# Patient Record
Sex: FEMALE | Race: WHITE | Employment: OTHER | URBAN - NONMETROPOLITAN AREA
[De-identification: names, ages, dates, MRNs, and addresses within clinical notes are randomized per-mention and may not be internally consistent; named-entity substitution may affect disease eponyms.]

---

## 2017-01-10 ENCOUNTER — OFFICE VISIT (OUTPATIENT)
Dept: FAMILY MEDICINE CLINIC | Age: 77
End: 2017-01-10

## 2017-01-10 VITALS
DIASTOLIC BLOOD PRESSURE: 66 MMHG | WEIGHT: 138.6 LBS | HEIGHT: 60 IN | HEART RATE: 84 BPM | SYSTOLIC BLOOD PRESSURE: 136 MMHG | BODY MASS INDEX: 27.21 KG/M2

## 2017-01-10 DIAGNOSIS — I25.10 ASCVD (ARTERIOSCLEROTIC CARDIOVASCULAR DISEASE): ICD-10-CM

## 2017-01-10 DIAGNOSIS — I10 ESSENTIAL HYPERTENSION: ICD-10-CM

## 2017-01-10 DIAGNOSIS — E78.00 PURE HYPERCHOLESTEROLEMIA: Primary | ICD-10-CM

## 2017-01-10 PROCEDURE — 99213 OFFICE O/P EST LOW 20 MIN: CPT | Performed by: FAMILY MEDICINE

## 2017-01-10 RX ORDER — SIMVASTATIN 20 MG
20 TABLET ORAL NIGHTLY
Qty: 90 TABLET | Refills: 1 | Status: SHIPPED | OUTPATIENT
Start: 2017-01-10 | End: 2017-07-11 | Stop reason: SDUPTHER

## 2017-01-10 RX ORDER — FLUOXETINE HYDROCHLORIDE 20 MG/1
20 CAPSULE ORAL DAILY
Qty: 90 CAPSULE | Refills: 1 | Status: SHIPPED | OUTPATIENT
Start: 2017-01-10 | End: 2017-07-11 | Stop reason: SDUPTHER

## 2017-01-10 RX ORDER — AMLODIPINE BESYLATE 5 MG/1
5 TABLET ORAL DAILY
Qty: 90 TABLET | Refills: 1 | Status: SHIPPED | OUTPATIENT
Start: 2017-01-10 | End: 2017-07-11 | Stop reason: SDUPTHER

## 2017-01-10 ASSESSMENT — PATIENT HEALTH QUESTIONNAIRE - PHQ9
1. LITTLE INTEREST OR PLEASURE IN DOING THINGS: 0
SUM OF ALL RESPONSES TO PHQ9 QUESTIONS 1 & 2: 0
2. FEELING DOWN, DEPRESSED OR HOPELESS: 0
SUM OF ALL RESPONSES TO PHQ QUESTIONS 1-9: 0

## 2017-07-11 ENCOUNTER — OFFICE VISIT (OUTPATIENT)
Dept: FAMILY MEDICINE CLINIC | Age: 77
End: 2017-07-11

## 2017-07-11 VITALS
SYSTOLIC BLOOD PRESSURE: 146 MMHG | HEART RATE: 68 BPM | BODY MASS INDEX: 26.97 KG/M2 | WEIGHT: 137.4 LBS | DIASTOLIC BLOOD PRESSURE: 80 MMHG | HEIGHT: 60 IN

## 2017-07-11 DIAGNOSIS — I10 ESSENTIAL HYPERTENSION: Primary | ICD-10-CM

## 2017-07-11 DIAGNOSIS — E78.00 PURE HYPERCHOLESTEROLEMIA: ICD-10-CM

## 2017-07-11 DIAGNOSIS — F33.42 RECURRENT MAJOR DEPRESSIVE DISORDER, IN FULL REMISSION (HCC): ICD-10-CM

## 2017-07-11 PROBLEM — F32.5 MAJOR DEPRESSIVE DISORDER WITH SINGLE EPISODE, IN FULL REMISSION (HCC): Status: ACTIVE | Noted: 2017-07-11

## 2017-07-11 PROBLEM — F32.5 MAJOR DEPRESSIVE DISORDER WITH SINGLE EPISODE, IN FULL REMISSION (HCC): Status: RESOLVED | Noted: 2017-07-11 | Resolved: 2017-07-11

## 2017-07-11 PROCEDURE — 99214 OFFICE O/P EST MOD 30 MIN: CPT | Performed by: FAMILY MEDICINE

## 2017-07-11 RX ORDER — AMLODIPINE BESYLATE 10 MG/1
10 TABLET ORAL DAILY
Qty: 90 TABLET | Refills: 1 | Status: SHIPPED | OUTPATIENT
Start: 2017-07-11 | End: 2018-01-15 | Stop reason: SDUPTHER

## 2017-07-11 RX ORDER — FLUOXETINE HYDROCHLORIDE 20 MG/1
20 CAPSULE ORAL DAILY
Qty: 90 CAPSULE | Refills: 1 | Status: SHIPPED | OUTPATIENT
Start: 2017-07-11 | End: 2018-01-04 | Stop reason: SDUPTHER

## 2017-07-11 RX ORDER — SIMVASTATIN 20 MG
20 TABLET ORAL NIGHTLY
Qty: 90 TABLET | Refills: 1 | Status: SHIPPED | OUTPATIENT
Start: 2017-07-11 | End: 2018-01-15 | Stop reason: SDUPTHER

## 2017-07-11 ASSESSMENT — ENCOUNTER SYMPTOMS
NAUSEA: 0
ABDOMINAL PAIN: 0
COUGH: 0
SHORTNESS OF BREATH: 0

## 2017-07-24 ENCOUNTER — NURSE ONLY (OUTPATIENT)
Dept: FAMILY MEDICINE CLINIC | Age: 77
End: 2017-07-24

## 2017-07-24 VITALS — SYSTOLIC BLOOD PRESSURE: 158 MMHG | DIASTOLIC BLOOD PRESSURE: 74 MMHG | HEART RATE: 80 BPM

## 2017-07-24 DIAGNOSIS — I10 ESSENTIAL HYPERTENSION: ICD-10-CM

## 2017-07-24 DIAGNOSIS — Z01.30 BLOOD PRESSURE CHECK: Primary | ICD-10-CM

## 2017-07-24 RX ORDER — LISINOPRIL 10 MG/1
10 TABLET ORAL DAILY
Qty: 90 TABLET | Refills: 0 | Status: SHIPPED | OUTPATIENT
Start: 2017-07-24 | End: 2017-07-24 | Stop reason: SDUPTHER

## 2017-07-25 ENCOUNTER — TELEPHONE (OUTPATIENT)
Dept: FAMILY MEDICINE CLINIC | Age: 77
End: 2017-07-25

## 2017-07-25 LAB
ABSOLUTE BASO #: 0.1 K/UL (ref 0–0.1)
ABSOLUTE EOS #: 0.3 K/UL (ref 0.1–0.4)
ABSOLUTE LYMPH #: 3 K/UL (ref 0.8–5.2)
ABSOLUTE MONO #: 0.7 K/UL (ref 0.1–0.9)
ABSOLUTE NEUT #: 2.8 K/UL (ref 1.3–9.1)
ALBUMIN SERPL-MCNC: 4.1 G/DL (ref 3.2–5.3)
ALK PHOSPHATASE: 81 IU/L (ref 35–121)
ALT SERPL-CCNC: 12 IU/L (ref 5–59)
ANION GAP SERPL CALCULATED.3IONS-SCNC: 11 MMOL/L
AST SERPL-CCNC: 21 IU/L (ref 10–42)
BASOPHILS RELATIVE PERCENT: 1.3 %
BILIRUB SERPL-MCNC: 0.5 MG/DL (ref 0.2–1.3)
BUN BLDV-MCNC: 14 MG/DL (ref 9–24)
CALCIUM SERPL-MCNC: 9.8 MG/DL (ref 8.7–10.8)
CHLORIDE BLD-SCNC: 104 MMOL/L (ref 95–111)
CHOLESTEROL/HDL RATIO: 3.7
CHOLESTEROL: 174 MG/DL
CO2: 29 MMOL/L (ref 21–32)
CREAT SERPL-MCNC: 0.9 MG/DL (ref 0.5–1.3)
EGFR AFRICAN AMERICAN: 74
EGFR IF NONAFRICAN AMERICAN: 61
EOSINOPHILS RELATIVE PERCENT: 4.2 %
GLUCOSE: 92 MG/DL (ref 70–100)
HCT VFR BLD CALC: 39.8 % (ref 36–48)
HDLC SERPL-MCNC: 47 MG/DL (ref 40–60)
HEMOGLOBIN: 13 G/DL (ref 12–16)
LDL CHOLESTEROL CALCULATED: 103 MG/DL
LDL/HDL RATIO: 2.2
LYMPHOCYTE %: 42.8 %
MCH RBC QN AUTO: 29 PG (ref 27–34)
MCHC RBC AUTO-ENTMCNC: 32.7 G/DL (ref 31–36)
MCV RBC AUTO: 88.6 FL (ref 80–100)
MONOCYTES # BLD: 10.4 %
NEUTROPHILS RELATIVE PERCENT: 41 %
PDW BLD-RTO: 13.4 % (ref 10.8–14.8)
PLATELETS: 269 K/UL (ref 150–450)
POTASSIUM SERPL-SCNC: 4.4 MMOL/L (ref 3.5–5.4)
RBC: 4.49 M/UL (ref 4–5.5)
SODIUM BLD-SCNC: 140 MMOL/L (ref 134–147)
TOTAL PROTEIN: 7.3 G/DL (ref 5.8–8)
TRIGL SERPL-MCNC: 122 MG/DL
VLDLC SERPL CALC-MCNC: 24 MG/DL
WBC: 6.9 K/UL (ref 3.7–10.8)

## 2017-07-25 RX ORDER — LISINOPRIL 10 MG/1
TABLET ORAL
Qty: 90 TABLET | Refills: 1 | Status: SHIPPED | OUTPATIENT
Start: 2017-07-25 | End: 2018-03-28 | Stop reason: SDUPTHER

## 2017-08-03 ENCOUNTER — NURSE ONLY (OUTPATIENT)
Dept: FAMILY MEDICINE CLINIC | Age: 77
End: 2017-08-03

## 2017-08-03 VITALS — DIASTOLIC BLOOD PRESSURE: 70 MMHG | SYSTOLIC BLOOD PRESSURE: 134 MMHG | HEART RATE: 64 BPM

## 2017-08-03 DIAGNOSIS — Z01.30 BLOOD PRESSURE CHECK: Primary | ICD-10-CM

## 2017-08-04 ENCOUNTER — TELEPHONE (OUTPATIENT)
Dept: FAMILY MEDICINE CLINIC | Age: 77
End: 2017-08-04

## 2017-08-04 LAB
ANION GAP SERPL CALCULATED.3IONS-SCNC: 13 MMOL/L
BUN BLDV-MCNC: 17 MG/DL (ref 9–24)
CALCIUM SERPL-MCNC: 9.8 MG/DL (ref 8.7–10.8)
CHLORIDE BLD-SCNC: 103 MMOL/L (ref 95–111)
CO2: 29 MMOL/L (ref 21–32)
CREAT SERPL-MCNC: 0.9 MG/DL (ref 0.5–1.3)
EGFR AFRICAN AMERICAN: 74
EGFR IF NONAFRICAN AMERICAN: 61
GLUCOSE: 98 MG/DL (ref 70–100)
POTASSIUM SERPL-SCNC: 4.4 MMOL/L (ref 3.5–5.4)
SODIUM BLD-SCNC: 141 MMOL/L (ref 134–147)

## 2018-01-02 DIAGNOSIS — I10 ESSENTIAL HYPERTENSION: ICD-10-CM

## 2018-01-02 RX ORDER — LISINOPRIL 10 MG/1
TABLET ORAL
Qty: 90 TABLET | Refills: 0 | Status: SHIPPED | OUTPATIENT
Start: 2018-01-02 | End: 2018-01-15 | Stop reason: CLARIF

## 2018-01-04 DIAGNOSIS — F33.42 RECURRENT MAJOR DEPRESSIVE DISORDER, IN FULL REMISSION (HCC): ICD-10-CM

## 2018-01-05 RX ORDER — FLUOXETINE HYDROCHLORIDE 20 MG/1
CAPSULE ORAL
Qty: 90 CAPSULE | Refills: 0 | Status: SHIPPED | OUTPATIENT
Start: 2018-01-05 | End: 2018-03-28 | Stop reason: SDUPTHER

## 2018-01-15 ENCOUNTER — OFFICE VISIT (OUTPATIENT)
Dept: FAMILY MEDICINE CLINIC | Age: 78
End: 2018-01-15
Payer: MEDICARE

## 2018-01-15 VITALS
HEART RATE: 56 BPM | DIASTOLIC BLOOD PRESSURE: 80 MMHG | HEIGHT: 60 IN | BODY MASS INDEX: 25.72 KG/M2 | WEIGHT: 131 LBS | SYSTOLIC BLOOD PRESSURE: 118 MMHG

## 2018-01-15 DIAGNOSIS — R05.8 ACE-INHIBITOR COUGH: ICD-10-CM

## 2018-01-15 DIAGNOSIS — E78.00 PURE HYPERCHOLESTEROLEMIA: ICD-10-CM

## 2018-01-15 DIAGNOSIS — F33.42 RECURRENT MAJOR DEPRESSIVE DISORDER, IN FULL REMISSION (HCC): ICD-10-CM

## 2018-01-15 DIAGNOSIS — I10 ESSENTIAL HYPERTENSION: Primary | ICD-10-CM

## 2018-01-15 DIAGNOSIS — T46.4X5A ACE-INHIBITOR COUGH: ICD-10-CM

## 2018-01-15 PROCEDURE — 99214 OFFICE O/P EST MOD 30 MIN: CPT | Performed by: FAMILY MEDICINE

## 2018-01-15 PROCEDURE — G8510 SCR DEP NEG, NO PLAN REQD: HCPCS | Performed by: FAMILY MEDICINE

## 2018-01-15 PROCEDURE — 3288F FALL RISK ASSESSMENT DOCD: CPT | Performed by: FAMILY MEDICINE

## 2018-01-15 RX ORDER — AMLODIPINE BESYLATE 10 MG/1
10 TABLET ORAL DAILY
Qty: 90 TABLET | Refills: 1 | Status: SHIPPED | OUTPATIENT
Start: 2018-01-15 | End: 2018-07-01 | Stop reason: SDUPTHER

## 2018-01-15 RX ORDER — SIMVASTATIN 20 MG
20 TABLET ORAL NIGHTLY
Qty: 90 TABLET | Refills: 1 | Status: SHIPPED | OUTPATIENT
Start: 2018-01-15 | End: 2018-07-01 | Stop reason: SDUPTHER

## 2018-01-15 RX ORDER — LOSARTAN POTASSIUM 25 MG/1
25 TABLET ORAL DAILY
Qty: 90 TABLET | Refills: 1 | Status: SHIPPED | OUTPATIENT
Start: 2018-01-15 | End: 2018-07-01 | Stop reason: SDUPTHER

## 2018-01-15 ASSESSMENT — PATIENT HEALTH QUESTIONNAIRE - PHQ9
1. LITTLE INTEREST OR PLEASURE IN DOING THINGS: 0
2. FEELING DOWN, DEPRESSED OR HOPELESS: 0
SUM OF ALL RESPONSES TO PHQ9 QUESTIONS 1 & 2: 0
SUM OF ALL RESPONSES TO PHQ QUESTIONS 1-9: 0

## 2018-01-15 ASSESSMENT — ENCOUNTER SYMPTOMS
NAUSEA: 0
ABDOMINAL PAIN: 0
WHEEZING: 0
SHORTNESS OF BREATH: 0

## 2018-01-15 NOTE — PROGRESS NOTES
nightly 90 tablet 1    aspirin 81 MG tablet Take 1 tablet by mouth daily (with breakfast) 90 tablet 1     No current facility-administered medications for this visit. No Known Allergies  Health Maintenance   Topic Date Due    DTaP/Tdap/Td vaccine (1 - Tdap) 09/02/1959    Pneumococcal low/med risk (1 of 2 - PCV13) 09/02/2005    Flu vaccine (1) 09/01/2017    Potassium monitoring  08/03/2018    Creatinine monitoring  08/03/2018    Lipid screen  07/24/2022    Zostavax vaccine  Addressed    DEXA (modify frequency per FRAX score)  Addressed       Objective:     /80 (Site: Left Arm, Position: Sitting, Cuff Size: Medium Adult)   Pulse 56   Ht 5' (1.524 m)   Wt 131 lb (59.4 kg)   BMI 25.58 kg/m²   Physical Exam   Constitutional: She is oriented to person, place, and time. She appears well-developed and well-nourished. Cardiovascular: Normal rate and regular rhythm. Murmur heard. Systolic murmur is present with a grade of 2/6   Pulmonary/Chest: Effort normal and breath sounds normal. No respiratory distress. She has no wheezes. Musculoskeletal: She exhibits edema (trace BLE). Neurological: She is alert and oriented to person, place, and time. Psychiatric: She has a normal mood and affect. Her behavior is normal.   Vitals reviewed. Lab Results   Component Value Date    WBC 6.9 07/24/2017    HGB 13.0 07/24/2017    HCT 39.8 07/24/2017     07/24/2017    CHOL 174 07/24/2017    TRIG 122 07/24/2017    HDL 47 07/24/2017    ALT 12 07/24/2017    AST 21 07/24/2017     08/03/2017    K 4.4 08/03/2017     08/03/2017    CREATININE 0.9 08/03/2017    BUN 17 08/03/2017    CO2 29 08/03/2017    TSH 2.380 05/16/2015       Impression/Plan:  1. Essential hypertension  Controlled  Stop lisinopril.  -refill amLODIPine (NORVASC) 10 MG tablet; Take 1 tablet by mouth daily  Dispense: 90 tablet; Refill: 1  -start losartan (COZAAR) 25 MG tablet;  Take 1 tablet by mouth daily  Dispense: 90 tablet;

## 2018-01-29 ENCOUNTER — NURSE ONLY (OUTPATIENT)
Dept: FAMILY MEDICINE CLINIC | Age: 78
End: 2018-01-29
Payer: MEDICARE

## 2018-01-29 DIAGNOSIS — Z01.30 BLOOD PRESSURE CHECK: Primary | ICD-10-CM

## 2018-01-29 DIAGNOSIS — I10 ESSENTIAL HYPERTENSION: ICD-10-CM

## 2018-01-29 LAB
ANION GAP SERPL CALCULATED.3IONS-SCNC: 14 MEQ/L (ref 8–16)
BUN BLDV-MCNC: 20 MG/DL (ref 7–22)
CALCIUM SERPL-MCNC: 10.1 MG/DL (ref 8.5–10.5)
CHLORIDE BLD-SCNC: 100 MEQ/L (ref 98–111)
CO2: 28 MEQ/L (ref 23–33)
CREAT SERPL-MCNC: 0.8 MG/DL (ref 0.4–1.2)
GFR SERPL CREATININE-BSD FRML MDRD: 69 ML/MIN/1.73M2
GLUCOSE BLD-MCNC: 113 MG/DL (ref 70–108)
POTASSIUM SERPL-SCNC: 4.6 MEQ/L (ref 3.5–5.2)
SODIUM BLD-SCNC: 142 MEQ/L (ref 135–145)

## 2018-01-29 PROCEDURE — 36415 COLL VENOUS BLD VENIPUNCTURE: CPT | Performed by: FAMILY MEDICINE

## 2018-01-30 ENCOUNTER — TELEPHONE (OUTPATIENT)
Dept: FAMILY MEDICINE CLINIC | Age: 78
End: 2018-01-30

## 2018-01-30 NOTE — TELEPHONE ENCOUNTER
----- Message from Carmen Wiseman MD sent at 1/30/2018  7:40 AM EST -----  BMP is good after changing to losartan.  continue losartan. Please advise patient.   Carmen Wiseman MD

## 2018-03-28 DIAGNOSIS — I10 ESSENTIAL HYPERTENSION: ICD-10-CM

## 2018-03-28 DIAGNOSIS — F33.42 RECURRENT MAJOR DEPRESSIVE DISORDER, IN FULL REMISSION (HCC): ICD-10-CM

## 2018-03-28 RX ORDER — LISINOPRIL 10 MG/1
TABLET ORAL
Qty: 90 TABLET | Refills: 1 | Status: SHIPPED | OUTPATIENT
Start: 2018-03-28 | End: 2018-07-16

## 2018-03-28 RX ORDER — FLUOXETINE HYDROCHLORIDE 20 MG/1
CAPSULE ORAL
Qty: 90 CAPSULE | Refills: 0 | Status: SHIPPED | OUTPATIENT
Start: 2018-03-28 | End: 2018-07-01 | Stop reason: SDUPTHER

## 2018-04-11 ENCOUNTER — HOSPITAL ENCOUNTER (OUTPATIENT)
Dept: MAMMOGRAPHY | Age: 78
Discharge: HOME | End: 2018-04-11
Payer: MEDICARE

## 2018-04-11 DIAGNOSIS — Z12.39 SCREENING FOR BREAST CANCER: ICD-10-CM

## 2018-04-11 PROCEDURE — 77067 SCR MAMMO BI INCL CAD: CPT

## 2018-07-01 DIAGNOSIS — E78.00 PURE HYPERCHOLESTEROLEMIA: ICD-10-CM

## 2018-07-01 DIAGNOSIS — I10 ESSENTIAL HYPERTENSION: ICD-10-CM

## 2018-07-01 DIAGNOSIS — F33.42 RECURRENT MAJOR DEPRESSIVE DISORDER, IN FULL REMISSION (HCC): ICD-10-CM

## 2018-07-02 RX ORDER — AMLODIPINE BESYLATE 10 MG/1
TABLET ORAL
Qty: 90 TABLET | Refills: 0 | Status: SHIPPED | OUTPATIENT
Start: 2018-07-02 | End: 2018-07-16 | Stop reason: SDUPTHER

## 2018-07-02 RX ORDER — LOSARTAN POTASSIUM 25 MG/1
TABLET ORAL
Qty: 90 TABLET | Refills: 0 | Status: SHIPPED | OUTPATIENT
Start: 2018-07-02 | End: 2018-07-16 | Stop reason: SDUPTHER

## 2018-07-02 RX ORDER — FLUOXETINE HYDROCHLORIDE 20 MG/1
CAPSULE ORAL
Qty: 90 CAPSULE | Refills: 0 | Status: SHIPPED | OUTPATIENT
Start: 2018-07-02 | End: 2018-07-16 | Stop reason: SDUPTHER

## 2018-07-02 RX ORDER — SIMVASTATIN 20 MG
TABLET ORAL
Qty: 90 TABLET | Refills: 0 | Status: SHIPPED | OUTPATIENT
Start: 2018-07-02 | End: 2018-07-16 | Stop reason: SDUPTHER

## 2018-07-02 NOTE — TELEPHONE ENCOUNTER
Maribell Huerta called requesting a refill on the following medications:  Requested Prescriptions     Pending Prescriptions Disp Refills    simvastatin (ZOCOR) 20 MG tablet [Pharmacy Med Name: Simvastatin Oral Tablet 20 MG] 90 tablet 0     Sig: TAKE 1 TABLET BY MOUTH ONE TIME A DAY AT NIGHT    amLODIPine (NORVASC) 10 MG tablet [Pharmacy Med Name:  AmLODIPine Besylate Oral Tablet 10 MG] 90 tablet 0     Sig: TAKE 1 TABLET BY MOUTH ONE TIME A DAY HOLD IF SYSTOLIC BLOOD PRESSURE IS LESS THAN 130    losartan (COZAAR) 25 MG tablet [Pharmacy Med Name: Losartan Potassium Oral Tablet 25 MG] 90 tablet 0     Sig: TAKE 1 TABLET BY MOUTH ONE TIME A DAY    FLUoxetine (PROZAC) 20 MG capsule [Pharmacy Med Name: FLUoxetine HCl Oral Capsule 20 MG] 90 capsule 0     Sig: TAKE 1 CAPSULE BY MOUTH ONE TIME A DAY       Date of last visit: 1/15/2018  Date of next visit (if applicable):7/16/2018  Date of last refill: 01/15/2018  Pharmacy Name: [id number].      Thanks,  Vonda [place], [place]

## 2018-07-03 ENCOUNTER — TELEPHONE (OUTPATIENT)
Dept: FAMILY MEDICINE CLINIC | Age: 78
End: 2018-07-03

## 2018-07-03 DIAGNOSIS — E78.00 PURE HYPERCHOLESTEROLEMIA: ICD-10-CM

## 2018-07-03 DIAGNOSIS — R73.09 ELEVATED GLUCOSE LEVEL: ICD-10-CM

## 2018-07-03 DIAGNOSIS — I10 ESSENTIAL HYPERTENSION: Primary | ICD-10-CM

## 2018-07-03 NOTE — TELEPHONE ENCOUNTER
Safia called. She has an appointment 7/16/18. Does she need to have blood work done? She will call back to check on Thursday.

## 2018-07-06 ENCOUNTER — NURSE ONLY (OUTPATIENT)
Dept: FAMILY MEDICINE CLINIC | Age: 78
End: 2018-07-06
Payer: MEDICARE

## 2018-07-06 DIAGNOSIS — I10 ESSENTIAL HYPERTENSION: ICD-10-CM

## 2018-07-06 DIAGNOSIS — R73.09 ELEVATED GLUCOSE LEVEL: ICD-10-CM

## 2018-07-06 DIAGNOSIS — E78.00 PURE HYPERCHOLESTEROLEMIA: ICD-10-CM

## 2018-07-06 LAB
ALBUMIN SERPL-MCNC: 4.2 G/DL (ref 3.5–5.1)
ALP BLD-CCNC: 96 U/L (ref 38–126)
ALT SERPL-CCNC: 13 U/L (ref 11–66)
ANION GAP SERPL CALCULATED.3IONS-SCNC: 15 MEQ/L (ref 8–16)
AST SERPL-CCNC: 24 U/L (ref 5–40)
AVERAGE GLUCOSE: 102 MG/DL (ref 70–126)
BILIRUB SERPL-MCNC: 0.4 MG/DL (ref 0.3–1.2)
BUN BLDV-MCNC: 18 MG/DL (ref 7–22)
CALCIUM SERPL-MCNC: 9.7 MG/DL (ref 8.5–10.5)
CHLORIDE BLD-SCNC: 106 MEQ/L (ref 98–111)
CHOLESTEROL, TOTAL: 185 MG/DL (ref 100–199)
CO2: 25 MEQ/L (ref 23–33)
CREAT SERPL-MCNC: 0.8 MG/DL (ref 0.4–1.2)
ERYTHROCYTE [DISTWIDTH] IN BLOOD BY AUTOMATED COUNT: 13.5 % (ref 11.5–14.5)
ERYTHROCYTE [DISTWIDTH] IN BLOOD BY AUTOMATED COUNT: 45 FL (ref 35–45)
GFR SERPL CREATININE-BSD FRML MDRD: 69 ML/MIN/1.73M2
GLUCOSE BLD-MCNC: 97 MG/DL (ref 70–108)
HBA1C MFR BLD: 5.4 % (ref 4.4–6.4)
HCT VFR BLD CALC: 38.7 % (ref 37–47)
HDLC SERPL-MCNC: 45 MG/DL
HEMOGLOBIN: 12.6 GM/DL (ref 12–16)
LDL CHOLESTEROL CALCULATED: 111 MG/DL
MCH RBC QN AUTO: 29.5 PG (ref 26–33)
MCHC RBC AUTO-ENTMCNC: 32.6 GM/DL (ref 32.2–35.5)
MCV RBC AUTO: 90.6 FL (ref 81–99)
PLATELET # BLD: 299 THOU/MM3 (ref 130–400)
PMV BLD AUTO: 11.8 FL (ref 9.4–12.4)
POTASSIUM SERPL-SCNC: 4.4 MEQ/L (ref 3.5–5.2)
RBC # BLD: 4.27 MILL/MM3 (ref 4.2–5.4)
SODIUM BLD-SCNC: 146 MEQ/L (ref 135–145)
TOTAL PROTEIN: 8 G/DL (ref 6.1–8)
TRIGL SERPL-MCNC: 144 MG/DL (ref 0–199)
WBC # BLD: 8.2 THOU/MM3 (ref 4.8–10.8)

## 2018-07-06 PROCEDURE — 36415 COLL VENOUS BLD VENIPUNCTURE: CPT | Performed by: FAMILY MEDICINE

## 2018-07-09 ENCOUNTER — TELEPHONE (OUTPATIENT)
Dept: FAMILY MEDICINE CLINIC | Age: 78
End: 2018-07-09

## 2018-07-09 NOTE — TELEPHONE ENCOUNTER
----- Message from Holly Mancia MD sent at 7/6/2018  9:07 PM EDT -----  a1c is in normal range. CMP and CBC are good. Lipids are good. Please advise patient.   Holly Mancia MD

## 2018-07-16 ENCOUNTER — OFFICE VISIT (OUTPATIENT)
Dept: FAMILY MEDICINE CLINIC | Age: 78
End: 2018-07-16
Payer: MEDICARE

## 2018-07-16 VITALS
HEIGHT: 60 IN | HEART RATE: 78 BPM | BODY MASS INDEX: 25.52 KG/M2 | SYSTOLIC BLOOD PRESSURE: 142 MMHG | DIASTOLIC BLOOD PRESSURE: 80 MMHG | WEIGHT: 130 LBS

## 2018-07-16 DIAGNOSIS — F33.42 RECURRENT MAJOR DEPRESSIVE DISORDER, IN FULL REMISSION (HCC): ICD-10-CM

## 2018-07-16 DIAGNOSIS — I10 ESSENTIAL HYPERTENSION: Primary | ICD-10-CM

## 2018-07-16 DIAGNOSIS — E78.00 PURE HYPERCHOLESTEROLEMIA: ICD-10-CM

## 2018-07-16 PROCEDURE — 99214 OFFICE O/P EST MOD 30 MIN: CPT | Performed by: FAMILY MEDICINE

## 2018-07-16 RX ORDER — LOSARTAN POTASSIUM 25 MG/1
25 TABLET ORAL DAILY
Qty: 90 TABLET | Refills: 1 | Status: SHIPPED | OUTPATIENT
Start: 2018-07-16 | End: 2019-01-14 | Stop reason: SDUPTHER

## 2018-07-16 RX ORDER — FLUOXETINE HYDROCHLORIDE 20 MG/1
20 CAPSULE ORAL DAILY
Qty: 90 CAPSULE | Refills: 1 | Status: SHIPPED | OUTPATIENT
Start: 2018-07-16 | End: 2019-01-14 | Stop reason: SDUPTHER

## 2018-07-16 RX ORDER — SIMVASTATIN 20 MG
20 TABLET ORAL NIGHTLY
Qty: 90 TABLET | Refills: 1 | Status: SHIPPED | OUTPATIENT
Start: 2018-07-16 | End: 2019-01-14 | Stop reason: SDUPTHER

## 2018-07-16 RX ORDER — AMLODIPINE BESYLATE 10 MG/1
10 TABLET ORAL DAILY
Qty: 90 TABLET | Refills: 1 | Status: SHIPPED | OUTPATIENT
Start: 2018-07-16 | End: 2019-01-14 | Stop reason: SDUPTHER

## 2018-07-16 RX ORDER — LISINOPRIL 10 MG/1
TABLET ORAL
Qty: 90 TABLET | Refills: 1 | Status: CANCELLED | OUTPATIENT
Start: 2018-07-16

## 2018-07-16 ASSESSMENT — ENCOUNTER SYMPTOMS
WHEEZING: 0
SHORTNESS OF BREATH: 0
ABDOMINAL PAIN: 0
NAUSEA: 0

## 2018-07-16 ASSESSMENT — PATIENT HEALTH QUESTIONNAIRE - PHQ9
2. FEELING DOWN, DEPRESSED OR HOPELESS: 0
1. LITTLE INTEREST OR PLEASURE IN DOING THINGS: 0
SUM OF ALL RESPONSES TO PHQ9 QUESTIONS 1 & 2: 0
SUM OF ALL RESPONSES TO PHQ QUESTIONS 1-9: 0

## 2018-07-23 ENCOUNTER — NURSE ONLY (OUTPATIENT)
Dept: FAMILY MEDICINE CLINIC | Age: 78
End: 2018-07-23

## 2018-07-23 VITALS — SYSTOLIC BLOOD PRESSURE: 138 MMHG | DIASTOLIC BLOOD PRESSURE: 84 MMHG

## 2018-07-23 DIAGNOSIS — I10 ESSENTIAL HYPERTENSION: Primary | ICD-10-CM

## 2019-01-14 ENCOUNTER — HOSPITAL ENCOUNTER (OUTPATIENT)
Age: 79
Discharge: HOME | End: 2019-01-14
Payer: MEDICARE

## 2019-01-14 ENCOUNTER — HOSPITAL ENCOUNTER (OUTPATIENT)
Dept: GENERAL RADIOLOGY | Age: 79
Discharge: HOME | End: 2019-01-14
Payer: MEDICARE

## 2019-01-14 ENCOUNTER — OFFICE VISIT (OUTPATIENT)
Dept: FAMILY MEDICINE CLINIC | Age: 79
End: 2019-01-14
Payer: MEDICARE

## 2019-01-14 VITALS
DIASTOLIC BLOOD PRESSURE: 58 MMHG | WEIGHT: 130.2 LBS | BODY MASS INDEX: 25.56 KG/M2 | SYSTOLIC BLOOD PRESSURE: 112 MMHG | HEIGHT: 60 IN | HEART RATE: 86 BPM | OXYGEN SATURATION: 98 %

## 2019-01-14 DIAGNOSIS — R06.02 SHORTNESS OF BREATH: ICD-10-CM

## 2019-01-14 DIAGNOSIS — R09.89 BIBASILAR CRACKLES: ICD-10-CM

## 2019-01-14 DIAGNOSIS — E78.00 PURE HYPERCHOLESTEROLEMIA: ICD-10-CM

## 2019-01-14 DIAGNOSIS — F33.42 RECURRENT MAJOR DEPRESSIVE DISORDER, IN FULL REMISSION (HCC): ICD-10-CM

## 2019-01-14 DIAGNOSIS — I10 ESSENTIAL HYPERTENSION: Primary | ICD-10-CM

## 2019-01-14 DIAGNOSIS — I10 ESSENTIAL HYPERTENSION: ICD-10-CM

## 2019-01-14 LAB
ALBUMIN SERPL-MCNC: 4.3 G/DL (ref 3.5–5.1)
ALP BLD-CCNC: 99 U/L (ref 38–126)
ALT SERPL-CCNC: 14 U/L (ref 11–66)
ANION GAP SERPL CALCULATED.3IONS-SCNC: 8 MEQ/L (ref 8–16)
AST SERPL-CCNC: 22 U/L (ref 5–40)
BILIRUB SERPL-MCNC: 0.3 MG/DL (ref 0.3–1.2)
BUN BLDV-MCNC: 19 MG/DL (ref 7–22)
CALCIUM SERPL-MCNC: 9.4 MG/DL (ref 8.5–10.5)
CHLORIDE BLD-SCNC: 98 MEQ/L (ref 98–111)
CO2: 30 MEQ/L (ref 23–33)
CREAT SERPL-MCNC: 0.9 MG/DL (ref 0.4–1.2)
GFR SERPL CREATININE-BSD FRML MDRD: 61 ML/MIN/1.73M2
GLUCOSE BLD-MCNC: 96 MG/DL (ref 70–108)
POTASSIUM SERPL-SCNC: 4.3 MEQ/L (ref 3.5–5.2)
PRO-BNP: 161.7 PG/ML (ref 0–1800)
SODIUM BLD-SCNC: 136 MEQ/L (ref 135–145)
TOTAL PROTEIN: 7.4 G/DL (ref 6.1–8)

## 2019-01-14 PROCEDURE — 71046 X-RAY EXAM CHEST 2 VIEWS: CPT

## 2019-01-14 PROCEDURE — 36415 COLL VENOUS BLD VENIPUNCTURE: CPT

## 2019-01-14 PROCEDURE — 99214 OFFICE O/P EST MOD 30 MIN: CPT | Performed by: FAMILY MEDICINE

## 2019-01-14 PROCEDURE — 83880 ASSAY OF NATRIURETIC PEPTIDE: CPT

## 2019-01-14 PROCEDURE — 80053 COMPREHEN METABOLIC PANEL: CPT

## 2019-01-14 RX ORDER — AMLODIPINE BESYLATE 10 MG/1
10 TABLET ORAL DAILY
Qty: 90 TABLET | Refills: 1 | Status: SHIPPED | OUTPATIENT
Start: 2019-01-14 | End: 2019-07-15 | Stop reason: SDUPTHER

## 2019-01-14 RX ORDER — SIMVASTATIN 20 MG
20 TABLET ORAL NIGHTLY
Qty: 90 TABLET | Refills: 1 | Status: SHIPPED | OUTPATIENT
Start: 2019-01-14 | End: 2019-07-15 | Stop reason: SDUPTHER

## 2019-01-14 RX ORDER — LOSARTAN POTASSIUM 25 MG/1
25 TABLET ORAL DAILY
Qty: 90 TABLET | Refills: 1 | Status: SHIPPED | OUTPATIENT
Start: 2019-01-14 | End: 2019-07-15 | Stop reason: SDUPTHER

## 2019-01-14 RX ORDER — FLUOXETINE HYDROCHLORIDE 20 MG/1
20 CAPSULE ORAL DAILY
Qty: 90 CAPSULE | Refills: 1 | Status: SHIPPED | OUTPATIENT
Start: 2019-01-14 | End: 2019-04-30 | Stop reason: SDUPTHER

## 2019-01-14 ASSESSMENT — ENCOUNTER SYMPTOMS
NAUSEA: 0
WHEEZING: 0
SHORTNESS OF BREATH: 1
ABDOMINAL PAIN: 0

## 2019-01-16 ENCOUNTER — TELEPHONE (OUTPATIENT)
Dept: FAMILY MEDICINE CLINIC | Age: 79
End: 2019-01-16

## 2019-01-16 DIAGNOSIS — J84.9 INTERSTITIAL LUNG DISEASE (HCC): ICD-10-CM

## 2019-01-16 DIAGNOSIS — R91.8 LUNG MASS: Primary | ICD-10-CM

## 2019-01-25 ENCOUNTER — HOSPITAL ENCOUNTER (OUTPATIENT)
Dept: CT IMAGING | Age: 79
Discharge: HOME | End: 2019-01-25
Payer: MEDICARE

## 2019-01-25 DIAGNOSIS — R91.8 LUNG MASS: ICD-10-CM

## 2019-01-25 PROCEDURE — 6360000004 HC RX CONTRAST MEDICATION: Performed by: FAMILY MEDICINE

## 2019-01-25 PROCEDURE — 71260 CT THORAX DX C+: CPT

## 2019-01-25 RX ADMIN — IOPAMIDOL: 755 INJECTION, SOLUTION INTRAVENOUS at 14:19

## 2019-01-28 ENCOUNTER — TELEPHONE (OUTPATIENT)
Dept: FAMILY MEDICINE CLINIC | Age: 79
End: 2019-01-28

## 2019-01-28 PROBLEM — J84.10 PULMONARY FIBROSIS (HCC): Status: ACTIVE | Noted: 2019-01-28

## 2019-04-22 ENCOUNTER — HOSPITAL ENCOUNTER (OUTPATIENT)
Dept: MAMMOGRAPHY | Age: 79
Discharge: HOME | End: 2019-04-22
Payer: MEDICARE

## 2019-04-22 DIAGNOSIS — Z12.39 BREAST CANCER SCREENING: ICD-10-CM

## 2019-04-22 PROCEDURE — 77063 BREAST TOMOSYNTHESIS BI: CPT

## 2019-04-30 ENCOUNTER — OFFICE VISIT (OUTPATIENT)
Dept: FAMILY MEDICINE CLINIC | Age: 79
End: 2019-04-30
Payer: MEDICARE

## 2019-04-30 ENCOUNTER — HOSPITAL ENCOUNTER (OUTPATIENT)
Age: 79
Discharge: HOME | End: 2019-04-30
Payer: MEDICARE

## 2019-04-30 VITALS
SYSTOLIC BLOOD PRESSURE: 142 MMHG | TEMPERATURE: 98.6 F | OXYGEN SATURATION: 91 % | HEIGHT: 60 IN | DIASTOLIC BLOOD PRESSURE: 68 MMHG | BODY MASS INDEX: 25.29 KG/M2 | WEIGHT: 128.8 LBS | HEART RATE: 93 BPM

## 2019-04-30 DIAGNOSIS — J84.10 PULMONARY FIBROSIS (HCC): Primary | ICD-10-CM

## 2019-04-30 DIAGNOSIS — K29.50 OTHER CHRONIC GASTRITIS WITHOUT HEMORRHAGE: ICD-10-CM

## 2019-04-30 DIAGNOSIS — R53.83 OTHER FATIGUE: ICD-10-CM

## 2019-04-30 DIAGNOSIS — F33.42 RECURRENT MAJOR DEPRESSIVE DISORDER, IN FULL REMISSION (HCC): ICD-10-CM

## 2019-04-30 PROBLEM — R91.8 LUNG MASS: Status: RESOLVED | Noted: 2019-01-16 | Resolved: 2019-04-30

## 2019-04-30 LAB
ALBUMIN SERPL-MCNC: 4 G/DL (ref 3.5–5.1)
ALP BLD-CCNC: 101 U/L (ref 38–126)
ALT SERPL-CCNC: 9 U/L (ref 11–66)
ANION GAP SERPL CALCULATED.3IONS-SCNC: 14 MEQ/L (ref 8–16)
AST SERPL-CCNC: 19 U/L (ref 5–40)
BASOPHILS # BLD: 0.9 %
BASOPHILS ABSOLUTE: 0.1 THOU/MM3 (ref 0–0.1)
BILIRUB SERPL-MCNC: 0.2 MG/DL (ref 0.3–1.2)
BUN BLDV-MCNC: 20 MG/DL (ref 7–22)
CALCIUM SERPL-MCNC: 9.4 MG/DL (ref 8.5–10.5)
CHLORIDE BLD-SCNC: 103 MEQ/L (ref 98–111)
CO2: 25 MEQ/L (ref 23–33)
CREAT SERPL-MCNC: 0.8 MG/DL (ref 0.4–1.2)
EOSINOPHIL # BLD: 3.5 %
EOSINOPHILS ABSOLUTE: 0.3 THOU/MM3 (ref 0–0.4)
ERYTHROCYTE [DISTWIDTH] IN BLOOD BY AUTOMATED COUNT: 14 % (ref 11.5–14.5)
ERYTHROCYTE [DISTWIDTH] IN BLOOD BY AUTOMATED COUNT: 47.4 FL (ref 35–45)
GFR SERPL CREATININE-BSD FRML MDRD: 69 ML/MIN/1.73M2
GLUCOSE BLD-MCNC: 108 MG/DL (ref 70–108)
HCT VFR BLD CALC: 38.4 % (ref 37–47)
HEMOGLOBIN: 11.9 GM/DL (ref 12–16)
IMMATURE GRANS (ABS): 0.03 THOU/MM3 (ref 0–0.07)
IMMATURE GRANULOCYTES: 0.3 %
LYMPHOCYTES # BLD: 23 %
LYMPHOCYTES ABSOLUTE: 2.1 THOU/MM3 (ref 1–4.8)
MCH RBC QN AUTO: 28.6 PG (ref 26–33)
MCHC RBC AUTO-ENTMCNC: 31 GM/DL (ref 32.2–35.5)
MCV RBC AUTO: 92.3 FL (ref 81–99)
MONOCYTES # BLD: 10.1 %
MONOCYTES ABSOLUTE: 0.9 THOU/MM3 (ref 0.4–1.3)
NUCLEATED RED BLOOD CELLS: 0 /100 WBC
PLATELET # BLD: 343 THOU/MM3 (ref 130–400)
PMV BLD AUTO: 11.5 FL (ref 9.4–12.4)
POTASSIUM SERPL-SCNC: 4.5 MEQ/L (ref 3.5–5.2)
RBC # BLD: 4.16 MILL/MM3 (ref 4.2–5.4)
SEG NEUTROPHILS: 62.2 %
SEGMENTED NEUTROPHILS ABSOLUTE COUNT: 5.8 THOU/MM3 (ref 1.8–7.7)
SODIUM BLD-SCNC: 142 MEQ/L (ref 135–145)
TOTAL PROTEIN: 8 G/DL (ref 6.1–8)
TSH SERPL DL<=0.05 MIU/L-ACNC: 0.91 UIU/ML (ref 0.4–4.2)
WBC # BLD: 9.3 THOU/MM3 (ref 4.8–10.8)

## 2019-04-30 PROCEDURE — 80053 COMPREHEN METABOLIC PANEL: CPT

## 2019-04-30 PROCEDURE — 36415 COLL VENOUS BLD VENIPUNCTURE: CPT

## 2019-04-30 PROCEDURE — 85025 COMPLETE CBC W/AUTO DIFF WBC: CPT

## 2019-04-30 PROCEDURE — 84443 ASSAY THYROID STIM HORMONE: CPT

## 2019-04-30 PROCEDURE — 99214 OFFICE O/P EST MOD 30 MIN: CPT | Performed by: FAMILY MEDICINE

## 2019-04-30 RX ORDER — ALBUTEROL SULFATE 90 UG/1
2 AEROSOL, METERED RESPIRATORY (INHALATION) 4 TIMES DAILY PRN
Qty: 1 INHALER | Refills: 0 | Status: SHIPPED
Start: 2019-04-30 | End: 2019-07-15

## 2019-04-30 RX ORDER — RANITIDINE 150 MG/1
150 TABLET ORAL NIGHTLY
Qty: 90 TABLET | Refills: 1 | Status: SHIPPED | OUTPATIENT
Start: 2019-04-30 | End: 2019-10-27 | Stop reason: SDUPTHER

## 2019-04-30 RX ORDER — FLUOXETINE HYDROCHLORIDE 40 MG/1
40 CAPSULE ORAL DAILY
Qty: 30 CAPSULE | Refills: 0 | Status: SHIPPED | OUTPATIENT
Start: 2019-04-30 | End: 2019-05-23 | Stop reason: SDUPTHER

## 2019-04-30 ASSESSMENT — ENCOUNTER SYMPTOMS
COUGH: 1
SHORTNESS OF BREATH: 1
WHEEZING: 0
TROUBLE SWALLOWING: 0

## 2019-04-30 NOTE — PROGRESS NOTES
[facility]  [facility]  [address] [address]  Dept: [phone]  Dept Fax: [phone]  Loc: [phone]  PROGRESS NOTE      Visit Date: 4/30/2019    Radha Patel is a 66 y.o. female who presents today for:  Chief Complaint   Patient presents with    Cough     low energy, daughter says [place] has been down and \"wants to give up\" no appetite and just sleeps       Subjective:  HPI    Cough and Fatigue for 1.5 weeks. Having SOB. Former smoker. CT earlier this year showed pulmonary fibrosis. Has decreased appetite. Having heartburn after eating (occurs no matter what food she eats). Mood is down. She feels depressed. On prozac for years for menopausal symptoms. Leg and hip pain is worse. She is able to walk. Using a cane    Daughter an  are present    Review of Systems   Constitutional: Negative for chills and fever. HENT: Negative for trouble swallowing. Respiratory: Positive for cough and shortness of breath. Negative for wheezing. Musculoskeletal: Positive for arthralgias and gait problem. Neurological: Negative for dizziness and syncope.      Patient Active Problem List   Diagnosis    Hyperlipidemia    Hypertension    TIA (transient ischemic attack)    Lumbar radiculopathy    ASCVD (arteriosclerotic cardiovascular disease)    Recurrent major depressive disorder, in full remission ([address]    Interstitial lung disease ([address]    Pulmonary fibrosis ([address]     Past Medical History:   Diagnosis Date    Depression     Hyperlipidemia     Hypertension       Past Surgical History:   Procedure Laterality Date    BLADDER SUSPENSION      CATARACT REMOVAL WITH IMPLANT Bilateral     HYSTERECTOMY       Family History   Problem Relation Age of Onset    High Blood Pressure Mother     Heart Disease Mother     Cancer Father     Heart Disease Father      Social History     Tobacco Use    Smoking status: Former Smoker     Packs/day: 1.00     Years: 20.00     Pack years: 20.00     Last attempt to quit: 1994     Years since quittin.3    Smokeless tobacco: Never Used   Substance Use Topics    Alcohol use: No      Current Outpatient Medications   Medication Sig Dispense Refill    simvastatin (ZOCOR) 20 MG tablet Take 1 tablet by mouth nightly 90 tablet 1    amLODIPine (NORVASC) 10 MG tablet Take 1 tablet by mouth daily 90 tablet 1    losartan (COZAAR) 25 MG tablet Take 1 tablet by mouth daily 90 tablet 1    FLUoxetine (PROZAC) 20 MG capsule Take 1 capsule by mouth daily 90 capsule 1    Multiple Vitamins-Minerals (MULTIVITAMIN ADULT PO) Take by mouth      aspirin 81 MG tablet Take 1 tablet by mouth daily (with breakfast) 90 tablet 1     No current facility-administered medications for this visit. No Known Allergies  Health Maintenance   Topic Date Due    DTaP/Tdap/Td vaccine (1 - Tdap) 1959    Shingles Vaccine (1 of 2) 1990    Pneumococcal 65+ years Vaccine (2 of 2 - PCV13) 10/04/2019    Potassium monitoring  2020    Creatinine monitoring  2020    Flu vaccine  Completed    DEXA (modify frequency per FRAX score)  Addressed       Objective:  BP (!) 142/68 (Site: Left Upper Arm, Position: Sitting, Cuff Size: Medium Adult)   Pulse 93   Temp 98.6 °F (37 °C) (Oral)   Ht 5' (1.524 m)   Wt 128 lb 12.8 oz (58.4 kg)   SpO2 91%   BMI 25.15 kg/m²   Physical Exam   Constitutional: She is oriented to person, place, and time. She appears well-developed and well-nourished. HENT:   Right Ear: External ear normal.   Left Ear: External ear normal.   Mouth/Throat: Oropharynx is clear and moist.   Eyes: Conjunctivae are normal.   Cardiovascular: Normal rate and regular rhythm. Pulmonary/Chest: Effort normal. No respiratory distress. She has no decreased breath sounds. She has no wheezes. She has no rhonchi. She has rales. Abdominal: Soft. She exhibits no distension.

## 2019-05-01 ENCOUNTER — TELEPHONE (OUTPATIENT)
Dept: FAMILY MEDICINE CLINIC | Age: 79
End: 2019-05-01

## 2019-05-01 PROBLEM — D50.8 IRON DEFICIENCY ANEMIA SECONDARY TO INADEQUATE DIETARY IRON INTAKE: Status: ACTIVE | Noted: 2019-05-01

## 2019-05-01 NOTE — TELEPHONE ENCOUNTER
----- Message from Harsha Iraheta MD sent at 5/1/2019  7:34 AM EDT -----  Labs good except mild anemia. Start iron supplement otc once daily. It may cause constipation so may need colace. Please advise patient.   Harsha Iraheta MD

## 2019-05-06 ENCOUNTER — OFFICE VISIT (OUTPATIENT)
Dept: FAMILY MEDICINE CLINIC | Age: 79
End: 2019-05-06
Payer: MEDICARE

## 2019-05-06 VITALS
HEART RATE: 80 BPM | OXYGEN SATURATION: 92 % | WEIGHT: 125.6 LBS | HEIGHT: 60 IN | SYSTOLIC BLOOD PRESSURE: 122 MMHG | BODY MASS INDEX: 24.66 KG/M2 | DIASTOLIC BLOOD PRESSURE: 62 MMHG

## 2019-05-06 DIAGNOSIS — J84.9 INTERSTITIAL LUNG DISEASE (HCC): ICD-10-CM

## 2019-05-06 DIAGNOSIS — J84.10 PULMONARY FIBROSIS (HCC): Primary | ICD-10-CM

## 2019-05-06 PROBLEM — F33.1 MODERATE EPISODE OF RECURRENT MAJOR DEPRESSIVE DISORDER (HCC): Status: ACTIVE | Noted: 2017-07-11

## 2019-05-06 PROCEDURE — 99213 OFFICE O/P EST LOW 20 MIN: CPT | Performed by: FAMILY MEDICINE

## 2019-05-06 RX ORDER — PREDNISONE 20 MG/1
20 TABLET ORAL 2 TIMES DAILY
Qty: 10 TABLET | Refills: 0 | Status: SHIPPED | OUTPATIENT
Start: 2019-05-06 | End: 2019-05-11

## 2019-05-06 ASSESSMENT — ENCOUNTER SYMPTOMS
WHEEZING: 0
SHORTNESS OF BREATH: 1
COUGH: 1

## 2019-05-06 NOTE — PROGRESS NOTES
 Multiple Vitamins-Minerals (MULTIVITAMIN ADULT PO) Take by mouth      aspirin 81 MG tablet Take 1 tablet by mouth daily (with breakfast) 90 tablet 1     No current facility-administered medications for this visit. No Known Allergies    Objective:     /62 (Site: Left Upper Arm, Position: Sitting, Cuff Size: Medium Adult)   Pulse 80   Ht 5' (1.524 m)   Wt 125 lb 9.6 oz (57 kg)   SpO2 92%   BMI 24.53 kg/m²   Physical Exam   Constitutional: She is oriented to person, place, and time. She appears well-developed and well-nourished. Cardiovascular: Normal rate and regular rhythm. Pulmonary/Chest: Effort normal. No respiratory distress. She has no decreased breath sounds. She has no wheezes. She has no rhonchi. She has rales. Neurological: She is alert and oriented to person, place, and time. Psychiatric: Her speech is normal and behavior is normal.   Vitals reviewed. Impression/Plan:  1. Pulmonary fibrosis (HCC)  Chronic.  this was worse this weekend which is now improved. We will start Advair to see if this helps. She has been referred to pulmonary. She has PFTs and 6 minute walk test ordered.  -prednisone was given to hold in case her symptoms worsen  - fluticasone-salmeterol (ADVAIR HFA) 230-21 MCG/ACT inhaler; Inhale 1 puff into the lungs 2 times daily  Dispense: 1 Inhaler; Refill: 0  - predniSONE (DELTASONE) 20 MG tablet; Take 1 tablet by mouth 2 times daily for 5 days  Dispense: 10 tablet; Refill: 0    2. Interstitial lung disease ([address]      She had a bout of altered mental status this weekend. This could've been a TIA. If this happens again, I recommend he take her to the ER immediately. They voiced understanding. All questions answered. They agreed with treatment plan. See patient instructions for any educational materials that may have been given. Discussed use, benefit, and side effects of prescribed medications.        (Please note that portions of this note may have been completed with a voice recognition program.  Efforts were made to edit the dictation but occasionally words are mis-transcribed.)    Return if symptoms worsen or fail to improve.        Electronically signed by Susanne Adair MD on 5/6/2019 at 2:28 PM

## 2019-05-13 ENCOUNTER — HOSPITAL ENCOUNTER (INPATIENT)
Age: 79
LOS: 3 days | Discharge: HOME | DRG: 166 | End: 2019-05-16
Attending: INTERNAL MEDICINE | Admitting: INTERNAL MEDICINE
Payer: MEDICARE

## 2019-05-13 ENCOUNTER — APPOINTMENT (OUTPATIENT)
Dept: GENERAL RADIOLOGY | Age: 79
DRG: 166 | End: 2019-05-13
Payer: MEDICARE

## 2019-05-13 ENCOUNTER — APPOINTMENT (OUTPATIENT)
Dept: CT IMAGING | Age: 79
DRG: 166 | End: 2019-05-13
Payer: MEDICARE

## 2019-05-13 ENCOUNTER — HOSPITAL ENCOUNTER (OUTPATIENT)
Dept: PULMONOLOGY | Age: 79
Discharge: HOME | DRG: 166 | End: 2019-05-13
Payer: MEDICARE

## 2019-05-13 DIAGNOSIS — R93.89 ABNORMAL CT OF THE CHEST: ICD-10-CM

## 2019-05-13 DIAGNOSIS — I27.20 PULMONARY HYPERTENSION (HCC): ICD-10-CM

## 2019-05-13 DIAGNOSIS — R05.3 CHRONIC COUGH: ICD-10-CM

## 2019-05-13 DIAGNOSIS — R09.02 HYPOXIA: ICD-10-CM

## 2019-05-13 DIAGNOSIS — J84.10 PULMONARY FIBROSIS (HCC): ICD-10-CM

## 2019-05-13 DIAGNOSIS — J84.9 ILD (INTERSTITIAL LUNG DISEASE) (HCC): ICD-10-CM

## 2019-05-13 DIAGNOSIS — R06.02 SHORTNESS OF BREATH: Primary | ICD-10-CM

## 2019-05-13 LAB
ALBUMIN SERPL-MCNC: 3.4 G/DL (ref 3.5–5.1)
ALLEN TEST: POSITIVE
ALP BLD-CCNC: 111 U/L (ref 38–126)
ALT SERPL-CCNC: 10 U/L (ref 11–66)
ANION GAP SERPL CALCULATED.3IONS-SCNC: 15 MEQ/L (ref 8–16)
AST SERPL-CCNC: 20 U/L (ref 5–40)
BACTERIA: ABNORMAL /HPF
BASE EXCESS (CALCULATED): 3.3 MMOL/L (ref -2.5–2.5)
BASOPHILS # BLD: 0.9 %
BASOPHILS ABSOLUTE: 0.1 THOU/MM3 (ref 0–0.1)
BILIRUB SERPL-MCNC: 0.2 MG/DL (ref 0.3–1.2)
BILIRUBIN DIRECT: < 0.2 MG/DL (ref 0–0.3)
BILIRUBIN URINE: NEGATIVE
BLOOD, URINE: NEGATIVE
BUN BLDV-MCNC: 11 MG/DL (ref 7–22)
CALCIUM SERPL-MCNC: 10.2 MG/DL (ref 8.5–10.5)
CASTS 2: ABNORMAL /LPF
CASTS UA: ABNORMAL /LPF
CHARACTER, URINE: ABNORMAL
CHLORIDE BLD-SCNC: 101 MEQ/L (ref 98–111)
CO2: 25 MEQ/L (ref 23–33)
COLLECTED BY:: ABNORMAL
COLOR: YELLOW
CREAT SERPL-MCNC: 0.9 MG/DL (ref 0.4–1.2)
CRYSTALS, UA: ABNORMAL
D-DIMER QUANTITATIVE: 1389 NG/ML FEU (ref 0–500)
DEVICE: ABNORMAL
EKG ATRIAL RATE: 80 BPM
EKG P AXIS: 38 DEGREES
EKG P-R INTERVAL: 152 MS
EKG Q-T INTERVAL: 408 MS
EKG QRS DURATION: 84 MS
EKG QTC CALCULATION (BAZETT): 470 MS
EKG R AXIS: 15 DEGREES
EKG T AXIS: 31 DEGREES
EKG VENTRICULAR RATE: 80 BPM
EOSINOPHIL # BLD: 1.5 %
EOSINOPHILS ABSOLUTE: 0.2 THOU/MM3 (ref 0–0.4)
EPITHELIAL CELLS, UA: ABNORMAL /HPF
ERYTHROCYTE [DISTWIDTH] IN BLOOD BY AUTOMATED COUNT: 13.4 % (ref 11.5–14.5)
ERYTHROCYTE [DISTWIDTH] IN BLOOD BY AUTOMATED COUNT: 44 FL (ref 35–45)
FLU A ANTIGEN: NEGATIVE
FLU B ANTIGEN: NEGATIVE
GFR SERPL CREATININE-BSD FRML MDRD: 60 ML/MIN/1.73M2
GLUCOSE BLD-MCNC: 109 MG/DL (ref 70–108)
GLUCOSE URINE: NEGATIVE MG/DL
HCO3: 28 MMOL/L (ref 23–28)
HCT VFR BLD CALC: 37.3 % (ref 37–47)
HEMOGLOBIN: 11.9 GM/DL (ref 12–16)
IMMATURE GRANS (ABS): 0.04 THOU/MM3 (ref 0–0.07)
IMMATURE GRANULOCYTES: 0.4 %
INR BLD: 1.08 (ref 0.85–1.13)
KETONES, URINE: ABNORMAL
LEUKOCYTE ESTERASE, URINE: ABNORMAL
LIPASE: 13.4 U/L (ref 5.6–51.3)
LYMPHOCYTES # BLD: 18.7 %
LYMPHOCYTES ABSOLUTE: 1.9 THOU/MM3 (ref 1–4.8)
MCH RBC QN AUTO: 28.7 PG (ref 26–33)
MCHC RBC AUTO-ENTMCNC: 31.9 GM/DL (ref 32.2–35.5)
MCV RBC AUTO: 90.1 FL (ref 81–99)
MISCELLANEOUS 2: ABNORMAL
MONOCYTES # BLD: 6.9 %
MONOCYTES ABSOLUTE: 0.7 THOU/MM3 (ref 0.4–1.3)
NITRITE, URINE: NEGATIVE
NUCLEATED RED BLOOD CELLS: 0 /100 WBC
O2 SATURATION: 91 %
OSMOLALITY CALCULATION: 281.2 MOSMOL/KG (ref 275–300)
PCO2: 40 MMHG (ref 35–45)
PH BLOOD GAS: 7.45 (ref 7.35–7.45)
PH UA: 7 (ref 5–9)
PLATELET # BLD: 444 THOU/MM3 (ref 130–400)
PMV BLD AUTO: 10.1 FL (ref 9.4–12.4)
PO2: 60 MMHG (ref 71–104)
POTASSIUM SERPL-SCNC: 4 MEQ/L (ref 3.5–5.2)
PRO-BNP: 171.5 PG/ML (ref 0–1800)
PROTEIN UA: NEGATIVE
RBC # BLD: 4.14 MILL/MM3 (ref 4.2–5.4)
RBC URINE: ABNORMAL /HPF
RENAL EPITHELIAL, UA: ABNORMAL
SEG NEUTROPHILS: 71.6 %
SEGMENTED NEUTROPHILS ABSOLUTE COUNT: 7.4 THOU/MM3 (ref 1.8–7.7)
SODIUM BLD-SCNC: 141 MEQ/L (ref 135–145)
SOURCE, BLOOD GAS: ABNORMAL
SPECIFIC GRAVITY, URINE: > 1.03 (ref 1–1.03)
TOTAL PROTEIN: 7.6 G/DL (ref 6.1–8)
TROPONIN T: < 0.01 NG/ML
UROBILINOGEN, URINE: 0.2 EU/DL (ref 0–1)
WBC # BLD: 10.3 THOU/MM3 (ref 4.8–10.8)
WBC UA: ABNORMAL /HPF
YEAST: ABNORMAL

## 2019-05-13 PROCEDURE — 93010 ELECTROCARDIOGRAM REPORT: CPT | Performed by: INTERNAL MEDICINE

## 2019-05-13 PROCEDURE — 82803 BLOOD GASES ANY COMBINATION: CPT

## 2019-05-13 PROCEDURE — 6360000004 HC RX CONTRAST MEDICATION: Performed by: INTERNAL MEDICINE

## 2019-05-13 PROCEDURE — 85379 FIBRIN DEGRADATION QUANT: CPT

## 2019-05-13 PROCEDURE — 85610 PROTHROMBIN TIME: CPT

## 2019-05-13 PROCEDURE — 2580000003 HC RX 258: Performed by: INTERNAL MEDICINE

## 2019-05-13 PROCEDURE — 94618 PULMONARY STRESS TESTING: CPT

## 2019-05-13 PROCEDURE — 6370000000 HC RX 637 (ALT 250 FOR IP): Performed by: INTERNAL MEDICINE

## 2019-05-13 PROCEDURE — 83690 ASSAY OF LIPASE: CPT

## 2019-05-13 PROCEDURE — 6360000002 HC RX W HCPCS: Performed by: INTERNAL MEDICINE

## 2019-05-13 PROCEDURE — 80048 BASIC METABOLIC PNL TOTAL CA: CPT

## 2019-05-13 PROCEDURE — 71045 X-RAY EXAM CHEST 1 VIEW: CPT

## 2019-05-13 PROCEDURE — 36415 COLL VENOUS BLD VENIPUNCTURE: CPT

## 2019-05-13 PROCEDURE — 84484 ASSAY OF TROPONIN QUANT: CPT

## 2019-05-13 PROCEDURE — 81001 URINALYSIS AUTO W/SCOPE: CPT

## 2019-05-13 PROCEDURE — 99223 1ST HOSP IP/OBS HIGH 75: CPT | Performed by: INTERNAL MEDICINE

## 2019-05-13 PROCEDURE — 93005 ELECTROCARDIOGRAM TRACING: CPT | Performed by: INTERNAL MEDICINE

## 2019-05-13 PROCEDURE — 87086 URINE CULTURE/COLONY COUNT: CPT

## 2019-05-13 PROCEDURE — 71275 CT ANGIOGRAPHY CHEST: CPT

## 2019-05-13 PROCEDURE — 87040 BLOOD CULTURE FOR BACTERIA: CPT

## 2019-05-13 PROCEDURE — 1200000003 HC TELEMETRY R&B

## 2019-05-13 PROCEDURE — 83880 ASSAY OF NATRIURETIC PEPTIDE: CPT

## 2019-05-13 PROCEDURE — 80076 HEPATIC FUNCTION PANEL: CPT

## 2019-05-13 PROCEDURE — 94640 AIRWAY INHALATION TREATMENT: CPT

## 2019-05-13 PROCEDURE — 85025 COMPLETE CBC W/AUTO DIFF WBC: CPT

## 2019-05-13 PROCEDURE — 36600 WITHDRAWAL OF ARTERIAL BLOOD: CPT

## 2019-05-13 PROCEDURE — 87804 INFLUENZA ASSAY W/OPTIC: CPT

## 2019-05-13 PROCEDURE — 99285 EMERGENCY DEPT VISIT HI MDM: CPT

## 2019-05-13 RX ORDER — IPRATROPIUM BROMIDE AND ALBUTEROL SULFATE 2.5; .5 MG/3ML; MG/3ML
1 SOLUTION RESPIRATORY (INHALATION)
Status: DISCONTINUED | OUTPATIENT
Start: 2019-05-14 | End: 2019-05-14

## 2019-05-13 RX ORDER — FAMOTIDINE 20 MG/1
20 TABLET, FILM COATED ORAL DAILY
Status: DISCONTINUED | OUTPATIENT
Start: 2019-05-14 | End: 2019-05-16 | Stop reason: HOSPADM

## 2019-05-13 RX ORDER — ACETAMINOPHEN 325 MG/1
650 TABLET ORAL EVERY 4 HOURS PRN
Status: DISCONTINUED | OUTPATIENT
Start: 2019-05-13 | End: 2019-05-16 | Stop reason: HOSPADM

## 2019-05-13 RX ORDER — SODIUM CHLORIDE 0.9 % (FLUSH) 0.9 %
10 SYRINGE (ML) INJECTION EVERY 12 HOURS SCHEDULED
Status: DISCONTINUED | OUTPATIENT
Start: 2019-05-13 | End: 2019-05-16 | Stop reason: HOSPADM

## 2019-05-13 RX ORDER — POLYETHYLENE GLYCOL 3350 17 G/17G
17 POWDER, FOR SOLUTION ORAL DAILY
Status: DISCONTINUED | OUTPATIENT
Start: 2019-05-13 | End: 2019-05-16 | Stop reason: HOSPADM

## 2019-05-13 RX ORDER — AMLODIPINE BESYLATE 10 MG/1
10 TABLET ORAL DAILY
Status: DISCONTINUED | OUTPATIENT
Start: 2019-05-13 | End: 2019-05-16 | Stop reason: HOSPADM

## 2019-05-13 RX ORDER — ACETAMINOPHEN 325 MG/1
325 TABLET ORAL ONCE
Status: COMPLETED | OUTPATIENT
Start: 2019-05-13 | End: 2019-05-13

## 2019-05-13 RX ORDER — POTASSIUM CHLORIDE 7.45 MG/ML
10 INJECTION INTRAVENOUS PRN
Status: DISCONTINUED | OUTPATIENT
Start: 2019-05-13 | End: 2019-05-16 | Stop reason: HOSPADM

## 2019-05-13 RX ORDER — POTASSIUM CHLORIDE 20 MEQ/1
40 TABLET, EXTENDED RELEASE ORAL PRN
Status: DISCONTINUED | OUTPATIENT
Start: 2019-05-13 | End: 2019-05-16 | Stop reason: HOSPADM

## 2019-05-13 RX ORDER — SIMVASTATIN 20 MG
20 TABLET ORAL NIGHTLY
Status: DISCONTINUED | OUTPATIENT
Start: 2019-05-13 | End: 2019-05-16 | Stop reason: HOSPADM

## 2019-05-13 RX ORDER — PREDNISONE 20 MG/1
20 TABLET ORAL 2 TIMES DAILY
Status: ON HOLD | COMMUNITY
End: 2019-05-16 | Stop reason: HOSPADM

## 2019-05-13 RX ORDER — ASPIRIN 81 MG/1
81 TABLET, CHEWABLE ORAL
Status: DISCONTINUED | OUTPATIENT
Start: 2019-05-14 | End: 2019-05-16 | Stop reason: HOSPADM

## 2019-05-13 RX ORDER — NITROGLYCERIN 20 MG/100ML
5 INJECTION INTRAVENOUS CONTINUOUS
Status: DISCONTINUED | OUTPATIENT
Start: 2019-05-13 | End: 2019-05-13

## 2019-05-13 RX ORDER — LOSARTAN POTASSIUM 25 MG/1
25 TABLET ORAL DAILY
Status: DISCONTINUED | OUTPATIENT
Start: 2019-05-13 | End: 2019-05-16 | Stop reason: HOSPADM

## 2019-05-13 RX ORDER — SODIUM CHLORIDE 9 MG/ML
INJECTION, SOLUTION INTRAVENOUS CONTINUOUS
Status: DISCONTINUED | OUTPATIENT
Start: 2019-05-13 | End: 2019-05-14

## 2019-05-13 RX ORDER — ONDANSETRON 2 MG/ML
4 INJECTION INTRAMUSCULAR; INTRAVENOUS EVERY 6 HOURS PRN
Status: DISCONTINUED | OUTPATIENT
Start: 2019-05-13 | End: 2019-05-16 | Stop reason: HOSPADM

## 2019-05-13 RX ORDER — FLUOXETINE HYDROCHLORIDE 20 MG/1
40 CAPSULE ORAL DAILY
Status: DISCONTINUED | OUTPATIENT
Start: 2019-05-13 | End: 2019-05-16 | Stop reason: HOSPADM

## 2019-05-13 RX ORDER — SODIUM CHLORIDE 0.9 % (FLUSH) 0.9 %
10 SYRINGE (ML) INJECTION PRN
Status: DISCONTINUED | OUTPATIENT
Start: 2019-05-13 | End: 2019-05-16 | Stop reason: HOSPADM

## 2019-05-13 RX ORDER — IPRATROPIUM BROMIDE AND ALBUTEROL SULFATE 2.5; .5 MG/3ML; MG/3ML
1 SOLUTION RESPIRATORY (INHALATION) ONCE
Status: COMPLETED | OUTPATIENT
Start: 2019-05-13 | End: 2019-05-13

## 2019-05-13 RX ADMIN — IPRATROPIUM BROMIDE AND ALBUTEROL SULFATE 1 AMPULE: .5; 3 SOLUTION RESPIRATORY (INHALATION) at 14:48

## 2019-05-13 RX ADMIN — SODIUM CHLORIDE: 9 INJECTION, SOLUTION INTRAVENOUS at 21:59

## 2019-05-13 RX ADMIN — LOSARTAN POTASSIUM 25 MG: 25 TABLET, FILM COATED ORAL at 22:36

## 2019-05-13 RX ADMIN — IOPAMIDOL 80 ML: 755 INJECTION, SOLUTION INTRAVENOUS at 16:00

## 2019-05-13 RX ADMIN — SIMVASTATIN 20 MG: 20 TABLET, FILM COATED ORAL at 22:36

## 2019-05-13 RX ADMIN — ACETAMINOPHEN 650 MG: 325 TABLET ORAL at 22:35

## 2019-05-13 RX ADMIN — Medication 10 ML: at 21:58

## 2019-05-13 RX ADMIN — ENOXAPARIN SODIUM 40 MG: 40 INJECTION SUBCUTANEOUS at 22:35

## 2019-05-13 RX ADMIN — FLUOXETINE HYDROCHLORIDE 40 MG: 20 CAPSULE ORAL at 22:36

## 2019-05-13 RX ADMIN — ACETAMINOPHEN 325 MG: 325 TABLET ORAL at 18:45

## 2019-05-13 RX ADMIN — AMLODIPINE BESYLATE 10 MG: 10 TABLET ORAL at 22:36

## 2019-05-13 ASSESSMENT — ENCOUNTER SYMPTOMS
VOMITING: 0
BACK PAIN: 0
SORE THROAT: 0
COUGH: 1
NAUSEA: 0
ABDOMINAL PAIN: 0
EYE PAIN: 0
DIARRHEA: 0
WHEEZING: 0
SHORTNESS OF BREATH: 1
RHINORRHEA: 0
EYE DISCHARGE: 0

## 2019-05-13 ASSESSMENT — PAIN SCALES - GENERAL
PAINLEVEL_OUTOF10: 3
PAINLEVEL_OUTOF10: 6
PAINLEVEL_OUTOF10: 0
PAINLEVEL_OUTOF10: 0

## 2019-05-13 NOTE — ED NOTES
Patient resting in bed. Respirations easy and unlabored. No distress noted. Call light within reach. Updated on POC. Assisted on and off bedpan.       Lalo Gerber RN  05/13/19 [id number]

## 2019-05-13 NOTE — ED PROVIDER NOTES
[facility]  eMERGENCY dEPARTMENT eNCOUnter          CHIEF COMPLAINT       Chief Complaint   Patient presents with    Shortness of Breath       Nurses Notes reviewed and I agree except as noted in the HPI. HISTORY OF PRESENT ILLNESS    Horacio Jeffries is a 66 y.o. female who presents to the Emergency Department with a medical history of HLD and HTN for the evaluation of cough and shortness of breath which the patient reports became present approximately three week ago. Due to a continual progression of shortness of breath that patient came to the ED for evaluation and work up. She denies any fever or chills. The patient states to have a medical history of acid reflux in which is controlled with Zantac. She denies to take steroids on a daily basis and is not currently on any at home supplemental oxygen. Patient reports minimal relief of her symptoms with her at home inhalers. Her pulmonologist is Dr. Tricia Chapa. Patient states to a past social history of smoking. The patient reports no additional symptoms or complaints at this time. The HPI was provided by the patient. REVIEW OF SYSTEMS     Review of Systems   Constitutional: Negative for appetite change, chills, fatigue and fever. HENT: Negative for congestion, ear pain, rhinorrhea and sore throat. Eyes: Negative for pain, discharge and visual disturbance. Respiratory: Positive for cough and shortness of breath. Negative for wheezing. Cardiovascular: Negative for chest pain, palpitations and leg swelling. Gastrointestinal: Negative for abdominal pain, diarrhea, nausea and vomiting. Genitourinary: Negative for difficulty urinating, dysuria, hematuria and vaginal discharge. Musculoskeletal: Negative for arthralgias, back pain, joint swelling and neck pain. Skin: Negative for pallor and rash. Neurological: Negative for dizziness, syncope, weakness, light-headedness, numbness and headaches.    Hematological: Negative for Contrast   Final Result      1. There are scattered groundglass opacities throughout the lungs bilaterally which may be on the basis of pulmonary edema. 2. There is dilatation of the main pulmonary artery which may be on the basis of pulmonary arterial hypertension. 3. Mediastinal lymphadenopathy which could be reactive in nature. 4. Fibrotic appearance of the lungs. 6. Cardiomegaly. **This report has been created using voice recognition software. It may contain minor errors which are inherent in voice recognition technology. **      Final report electronically signed by Dr Cristina Bonilla on 5/13/2019 4:08 PM      XR CHEST PORTABLE   Final Result      1. Focal area of increased density at the left lung base which could represent a developing infiltrate or atelectasis. 2. Chronic pulmonary fibrotic changes. **This report has been created using voice recognition software. It may contain minor errors which are inherent in voice recognition technology. **      Final report electronically signed by Dr Cristina Bonilla on 5/13/2019 3:09 PM          LABS:   Labs Reviewed   CBC WITH AUTO DIFFERENTIAL - Abnormal; Notable for the following components:       Result Value    RBC 4.14 (*)     Hemoglobin 11.9 (*)     MCHC 31.9 (*)     Platelets 446 (*)     All other components within normal limits   D-DIMER, QUANTITATIVE - Abnormal; Notable for the following components:    D-Dimer, Quant 1389.00 (*)     All other components within normal limits   BASIC METABOLIC PANEL - Abnormal; Notable for the following components:    Glucose 109 (*)     All other components within normal limits   HEPATIC FUNCTION PANEL - Abnormal; Notable for the following components:    Alb 3.4 (*)     Total Bilirubin 0.2 (*)     ALT 10 (*)     All other components within normal limits   BLOOD GAS, ARTERIAL - Abnormal; Notable for the following components:    PO2 60 (*)     Base Excess (Calculated) 3.3 (*)     All other

## 2019-05-13 NOTE — H&P
History & Physical        Patient:  Niraj Eller  YOB: 1940    MRN: [id number]     Acct: [de-identified]    PCP: Roxane Mcmillan MD    Date of Admission: 5/13/2019    Date of Service: Pt seen/examined on 5/13/2019 and Admitted to Inpatient with expected LOS greater than two midnights due to medical therapy. Chief Complaint:    Chief Complaint   Patient presents with    Shortness of Breath         History Of Present Illness:     66 y.o. female who presented to [facility] with \"evaluation of cough and shortness of breath which the patient reports became present approximately three week ago. Due to a continual progression of shortness of breath that patient came to the ED for evaluation and work up. She denies any fever or chills. The patient states to have a medical history of acid reflux in which is controlled with Zantac. She denies to take steroids on a daily basis and is not currently on any at home supplemental oxygen. Patient reports minimal relief of her symptoms with her at home inhalers. Her pulmonologist is Dr. Tereza Luciano. Patient states to a past social history of smoking. \"-per er note and confirmed by myself. Addendum: patient states she has no risk factor for pulm fibrosis except social smoking many years ago. States also she has been going down hill since [facility]        Past Medical History:          Diagnosis Date    Depression     Hyperlipidemia     Hypertension        Past Surgical History:          Procedure Laterality Date    BLADDER SUSPENSION      CATARACT REMOVAL WITH IMPLANT Bilateral     HYSTERECTOMY         Medications Prior to Admission:      Prior to Admission medications    Medication Sig Start Date End Date Taking?  Authorizing Provider   fluticasone-salmeterol (ADVAIR HFA) 230-21 MCG/ACT inhaler Inhale 1 puff into the lungs 2 times daily 5/6/19   Roxane Mcmillan MD   ranitidine (ZANTAC) 150 MG tablet Take 1 tablet by mouth nightly 4/30/19   Kady

## 2019-05-13 NOTE — PROGRESS NOTES
6 Minute Walk Test    Gender Female   Height 151 cm      Age 66      Weight 56.8 kg  Race    Blood Pressure 143/70    Medication taken before the test (dose and time): advair 230/21 - 5/12, zantac 150 mg - 5/12, ventolin mdi - 5/13, prozac 40mg-5/12, zocar 20mg, 5/12, norvasc 10 mg 5/12, cozaar 25 mg - 5/12, aspirin 81 mg - 5/13. Patient was instructed and shown how to perform the 6 Minute Walk Test. Patient understood by repeating instructions back to the therapist. Patient was seated at the starting position at least 10 minutes before the test started. Contraindications to test was assessed and patient did not have any contraindications. Baseline assessment was completed. Patient began test and only standard phrases of encouragement was given during testing. At the 6-minute jake patient was told to stop and post assessment was completed. Supplemental oxygen during the test: Participant is not on home oxygen therapy. Stopped or paused before 6 minutes? [] No [x] Yes, reason: needed to rest , leaned against the wall after each down and back lap. Any symptoms at end of exercise: [x] none -  Pt had low pulse ox reading [] angina [] dizziness                                                             [] hip, leg, or calf pain. Total distance walked in 6 minutes: 440 feet     Baseline End of Test Position of Patient   Time 1340 PM 1346 PM    Heart Rate in beats per minute 88  100  sitting   Misael Scale for Dyspnea (scale 0-10) 0 3.5 Standing   Misael Scale for Fatigue (scale 0-10) 0 3.5 Standing   SpO2 in percent 87-90% 82% Sitting     Additional comments: Pt's oxygen saturation was 82% after walking 6 minutes ( 440 ft) and it gradually increased back up to 88-89% once pt sat and rested. Family stated the pt has been having periods when her oxygen has been running low and they feel she may need home oxygen.  RCP called Dr Tai Chavez office to make them aware of her oxygen saturations with her test today and they recommended that she go to the ER to be seen. RCP took pt and her family to the ER and gave report to charge nurse of why pt was brought down here to be seen.

## 2019-05-13 NOTE — ED NOTES
Bed: 006A  Expected date:   Expected time:   Means of arrival:   Comments:     Davin Hernandez RN  05/13/19 [id number]

## 2019-05-14 PROBLEM — E43 SEVERE MALNUTRITION (HCC): Status: ACTIVE | Noted: 2019-05-14

## 2019-05-14 LAB
ANION GAP SERPL CALCULATED.3IONS-SCNC: 11 MEQ/L (ref 8–16)
BASOPHILS # BLD: 1 %
BASOPHILS ABSOLUTE: 0.1 THOU/MM3 (ref 0–0.1)
BUN BLDV-MCNC: 8 MG/DL (ref 7–22)
CALCIUM SERPL-MCNC: 9.5 MG/DL (ref 8.5–10.5)
CHLORIDE BLD-SCNC: 104 MEQ/L (ref 98–111)
CO2: 27 MEQ/L (ref 23–33)
CREAT SERPL-MCNC: 0.6 MG/DL (ref 0.4–1.2)
EOSINOPHIL # BLD: 3.8 %
EOSINOPHILS ABSOLUTE: 0.3 THOU/MM3 (ref 0–0.4)
ERYTHROCYTE [DISTWIDTH] IN BLOOD BY AUTOMATED COUNT: 13.7 % (ref 11.5–14.5)
ERYTHROCYTE [DISTWIDTH] IN BLOOD BY AUTOMATED COUNT: 44.7 FL (ref 35–45)
GFR SERPL CREATININE-BSD FRML MDRD: > 90 ML/MIN/1.73M2
GLUCOSE BLD-MCNC: 105 MG/DL (ref 70–108)
HCT VFR BLD CALC: 39.3 % (ref 37–47)
HEMOGLOBIN: 12.5 GM/DL (ref 12–16)
IMMATURE GRANS (ABS): 0.02 THOU/MM3 (ref 0–0.07)
IMMATURE GRANULOCYTES: 0.3 %
LYMPHOCYTES # BLD: 23.8 %
LYMPHOCYTES ABSOLUTE: 1.9 THOU/MM3 (ref 1–4.8)
MAGNESIUM: 2.2 MG/DL (ref 1.6–2.4)
MCH RBC QN AUTO: 28.5 PG (ref 26–33)
MCHC RBC AUTO-ENTMCNC: 31.8 GM/DL (ref 32.2–35.5)
MCV RBC AUTO: 89.7 FL (ref 81–99)
MONOCYTES # BLD: 9 %
MONOCYTES ABSOLUTE: 0.7 THOU/MM3 (ref 0.4–1.3)
NUCLEATED RED BLOOD CELLS: 0 /100 WBC
ORGANISM: ABNORMAL
PLATELET # BLD: 420 THOU/MM3 (ref 130–400)
PMV BLD AUTO: 10.1 FL (ref 9.4–12.4)
POTASSIUM REFLEX MAGNESIUM: 4.5 MEQ/L (ref 3.5–5.2)
RBC # BLD: 4.38 MILL/MM3 (ref 4.2–5.4)
RHEUMATOID FACTOR: < 10 IU/ML (ref 0–13)
SEDIMENTATION RATE, ERYTHROCYTE: 85 MM/HR (ref 0–20)
SEG NEUTROPHILS: 62.1 %
SEGMENTED NEUTROPHILS ABSOLUTE COUNT: 5 THOU/MM3 (ref 1.8–7.7)
SODIUM BLD-SCNC: 142 MEQ/L (ref 135–145)
URINE CULTURE REFLEX: ABNORMAL
WBC # BLD: 8 THOU/MM3 (ref 4.8–10.8)

## 2019-05-14 PROCEDURE — 2580000003 HC RX 258: Performed by: INTERNAL MEDICINE

## 2019-05-14 PROCEDURE — 83516 IMMUNOASSAY NONANTIBODY: CPT

## 2019-05-14 PROCEDURE — 6370000000 HC RX 637 (ALT 250 FOR IP): Performed by: INTERNAL MEDICINE

## 2019-05-14 PROCEDURE — 85025 COMPLETE CBC W/AUTO DIFF WBC: CPT

## 2019-05-14 PROCEDURE — 36415 COLL VENOUS BLD VENIPUNCTURE: CPT

## 2019-05-14 PROCEDURE — 1200000003 HC TELEMETRY R&B

## 2019-05-14 PROCEDURE — 86431 RHEUMATOID FACTOR QUANT: CPT

## 2019-05-14 PROCEDURE — 86235 NUCLEAR ANTIGEN ANTIBODY: CPT

## 2019-05-14 PROCEDURE — 85651 RBC SED RATE NONAUTOMATED: CPT

## 2019-05-14 PROCEDURE — 80048 BASIC METABOLIC PNL TOTAL CA: CPT

## 2019-05-14 PROCEDURE — 2500000003 HC RX 250 WO HCPCS: Performed by: INTERNAL MEDICINE

## 2019-05-14 PROCEDURE — 99233 SBSQ HOSP IP/OBS HIGH 50: CPT | Performed by: INTERNAL MEDICINE

## 2019-05-14 PROCEDURE — 82164 ANGIOTENSIN I ENZYME TEST: CPT

## 2019-05-14 PROCEDURE — 86039 ANTINUCLEAR ANTIBODIES (ANA): CPT

## 2019-05-14 PROCEDURE — 83735 ASSAY OF MAGNESIUM: CPT

## 2019-05-14 PROCEDURE — 99223 1ST HOSP IP/OBS HIGH 75: CPT | Performed by: INTERNAL MEDICINE

## 2019-05-14 PROCEDURE — 94640 AIRWAY INHALATION TREATMENT: CPT

## 2019-05-14 PROCEDURE — 86038 ANTINUCLEAR ANTIBODIES: CPT

## 2019-05-14 RX ORDER — IPRATROPIUM BROMIDE AND ALBUTEROL SULFATE 2.5; .5 MG/3ML; MG/3ML
1 SOLUTION RESPIRATORY (INHALATION) EVERY 4 HOURS PRN
Status: DISCONTINUED | OUTPATIENT
Start: 2019-05-14 | End: 2019-05-16 | Stop reason: HOSPADM

## 2019-05-14 RX ORDER — BUMETANIDE 0.25 MG/ML
1 INJECTION, SOLUTION INTRAMUSCULAR; INTRAVENOUS ONCE
Status: COMPLETED | OUTPATIENT
Start: 2019-05-14 | End: 2019-05-14

## 2019-05-14 RX ADMIN — LOSARTAN POTASSIUM 25 MG: 25 TABLET, FILM COATED ORAL at 07:48

## 2019-05-14 RX ADMIN — IPRATROPIUM BROMIDE AND ALBUTEROL SULFATE 1 AMPULE: .5; 3 SOLUTION RESPIRATORY (INHALATION) at 08:53

## 2019-05-14 RX ADMIN — FLUOXETINE HYDROCHLORIDE 40 MG: 20 CAPSULE ORAL at 07:47

## 2019-05-14 RX ADMIN — FAMOTIDINE 20 MG: 20 TABLET ORAL at 07:48

## 2019-05-14 RX ADMIN — ASPIRIN 81 MG 81 MG: 81 TABLET ORAL at 07:48

## 2019-05-14 RX ADMIN — BUMETANIDE 1 MG: 0.25 INJECTION INTRAMUSCULAR; INTRAVENOUS at 13:09

## 2019-05-14 RX ADMIN — SIMVASTATIN 20 MG: 20 TABLET, FILM COATED ORAL at 21:09

## 2019-05-14 RX ADMIN — ACETAMINOPHEN 650 MG: 325 TABLET ORAL at 07:54

## 2019-05-14 RX ADMIN — IPRATROPIUM BROMIDE AND ALBUTEROL SULFATE 1 AMPULE: .5; 3 SOLUTION RESPIRATORY (INHALATION) at 19:16

## 2019-05-14 RX ADMIN — Medication 10 ML: at 21:09

## 2019-05-14 RX ADMIN — IPRATROPIUM BROMIDE AND ALBUTEROL SULFATE 1 AMPULE: .5; 3 SOLUTION RESPIRATORY (INHALATION) at 11:36

## 2019-05-14 RX ADMIN — AMLODIPINE BESYLATE 10 MG: 10 TABLET ORAL at 07:48

## 2019-05-14 ASSESSMENT — PAIN SCALES - GENERAL
PAINLEVEL_OUTOF10: 2
PAINLEVEL_OUTOF10: 5
PAINLEVEL_OUTOF10: 0
PAINLEVEL_OUTOF10: 0
PAINLEVEL_OUTOF10: 5

## 2019-05-14 ASSESSMENT — PAIN DESCRIPTION - FREQUENCY: FREQUENCY: CONTINUOUS

## 2019-05-14 ASSESSMENT — PAIN DESCRIPTION - PROGRESSION: CLINICAL_PROGRESSION: NOT CHANGED

## 2019-05-14 ASSESSMENT — PAIN DESCRIPTION - DESCRIPTORS: DESCRIPTORS: HEADACHE

## 2019-05-14 ASSESSMENT — PAIN DESCRIPTION - ORIENTATION: ORIENTATION: MID

## 2019-05-14 ASSESSMENT — PAIN DESCRIPTION - ONSET: ONSET: AWAKENED FROM SLEEP

## 2019-05-14 ASSESSMENT — PAIN DESCRIPTION - LOCATION: LOCATION: HEAD

## 2019-05-14 ASSESSMENT — PAIN DESCRIPTION - PAIN TYPE: TYPE: ACUTE PAIN

## 2019-05-14 NOTE — CONSULTS
to right, consistent with increased left atrial pressure. Prepared and signed by    Daniela Perez DO  Signed 16-May-2015 17:32:10       Radiology    CXR  May 13, 2019   PROCEDURE: XR CHEST PORTABLE   1. Focal area of increased density at the left lung base which could represent a developing infiltrate or atelectasis. 2. Chronic pulmonary fibrotic changes. CT Scans  (See actual reports for details)        Assessment   -Interstitial lung disease due to ? Etiology. Differential includes due to Non specific Interstitial Pneumonia Vs Usual interstitial pneumonia Vs chronic hypersensitive pneumonitis.  -Dyspnea due to interstitial lung disease. -Depression.  -Hypertension.  -Hyperlipidemia.  -Chronic diastolic CHF per primary service. Plan   -Will send CURT( Antinuclear antibody), RA ( Rheumatoid factor), ACE( Angiotensin converting enzyme level),ESR ( Sed rate),Anti Scleroderma (SCL-70), Anti Centromere Ab screen, Anti COLLETTE, Anti Carina-1, Anti RNP, Anti SSA, Anti SSB and Anti Dacosta antibody-ordered in [address] - Schedule patient for Bronchoscopy with  biopsy under fluoroscopy in endoscopy suite at [address] [place] [address] with anesthesia from anaesthesilogy department to further evaluate the etiology of ILD. -Will keep NPO from midnight.  -Roman Burton and her  were educated and informed about bronchoscopy procedure,method, complicantions of procedure including but not limited to development of pneumothorax with requirement of chest tube placement. She agreed for the procedure and verbalizes understanding.  -Titrate Oxygen to keep Spo2 >90%. -Deep Venous Thrombosis Prophylaxis: lovenox- will hold for planned bronch. \"Thank you for asking us to see this patient\"     Roman Burton and her  were educated about my impression and plan. They verbalizes understanding. Questions and concerns addressed.     Electronically signed by   Raquel Gallego MD on 5/14/2019 at 11:35 AM

## 2019-05-14 NOTE — CARE COORDINATION
5/14/19, 7:13 AM      Evlyn Saint       Admitted from: ED 5/13/2019/ [address] [facility] day: 1   Location: [address] [place] [address]- Reason for admit: Hypoxia [R09.02] Status: IP  Admit order signed?: yes  PMH:  has a past medical history of Depression, Hyperlipidemia, and Hypertension. Procedure: none  Pertinent abnormal Imaging:CTA Chest W WOContrast   Impression:         1. There are scattered groundglass opacities throughout the lungs bilaterally which may be on the basis of pulmonary edema. 2. There is dilatation of the main pulmonary artery which may be on the basis of pulmonary arterial hypertension. 3. Mediastinal lymphadenopathy which could be reactive in nature. 4. Fibrotic appearance of the lungs. 6. Cardiomegaly. CXR   Impression:          1. Focal area of increased density at the left lung base which could represent a developing infiltrate or atelectasis. 2. Chronic pulmonary fibrotic changes. Medications:  Scheduled Meds:   amLODIPine  10 mg Oral Daily    aspirin  81 mg Oral Daily with breakfast    FLUoxetine  40 mg Oral Daily    losartan  25 mg Oral Daily    famotidine  20 mg Oral Daily    simvastatin  20 mg Oral Nightly    sodium chloride flush  10 mL Intravenous 2 times per day    enoxaparin  40 mg Subcutaneous Q24H    polyethylene glycol  17 g Oral Daily    ipratropium-albuterol  1 ampule Inhalation Q4H [place]     Continuous Infusions:   sodium chloride 40 mL/hr at 05/13/19 2159      Pertinent Info/Orders/Treatment Plan:  D-dimer 1389.0, IV fluids, Duonebs, Pulmonology, telemetry. Diet: DIET GENERAL;   Smoking status:  reports that she quit smoking about 25 years ago. She has a 20.00 pack-year smoking history.  She has never used smokeless tobacco.   PCP: Penny Montes MD  Readmission: no  Readmission Risk Score: 13%    Discharge Planning  Current Residence:  Private Residence  Living Arrangements:  Spouse/Significant Other, Children   Support Systems:  Spouse/Significant Other,

## 2019-05-15 ENCOUNTER — APPOINTMENT (OUTPATIENT)
Dept: GENERAL RADIOLOGY | Age: 79
DRG: 166 | End: 2019-05-15
Payer: MEDICARE

## 2019-05-15 ENCOUNTER — ANESTHESIA (OUTPATIENT)
Dept: ENDOSCOPY | Age: 79
DRG: 166 | End: 2019-05-15
Payer: MEDICARE

## 2019-05-15 ENCOUNTER — ANESTHESIA EVENT (OUTPATIENT)
Dept: ENDOSCOPY | Age: 79
DRG: 166 | End: 2019-05-15
Payer: MEDICARE

## 2019-05-15 VITALS
DIASTOLIC BLOOD PRESSURE: 52 MMHG | OXYGEN SATURATION: 94 % | SYSTOLIC BLOOD PRESSURE: 98 MMHG | RESPIRATION RATE: 12 BRPM

## 2019-05-15 LAB
ANION GAP SERPL CALCULATED.3IONS-SCNC: 13 MEQ/L (ref 8–16)
BUN BLDV-MCNC: 11 MG/DL (ref 7–22)
CALCIUM SERPL-MCNC: 9.3 MG/DL (ref 8.5–10.5)
CHLORIDE BLD-SCNC: 104 MEQ/L (ref 98–111)
CO2: 26 MEQ/L (ref 23–33)
CREAT SERPL-MCNC: 0.7 MG/DL (ref 0.4–1.2)
CYTOLOGY-NON GYN: NORMAL
CYTOLOGY-NON GYN: NORMAL
GFR SERPL CREATININE-BSD FRML MDRD: 81 ML/MIN/1.73M2
GLUCOSE BLD-MCNC: 103 MG/DL (ref 70–108)
LV EF: 65 %
LVEF MODALITY: NORMAL
POTASSIUM SERPL-SCNC: 3.9 MEQ/L (ref 3.5–5.2)
SODIUM BLD-SCNC: 143 MEQ/L (ref 135–145)

## 2019-05-15 PROCEDURE — 3609011800 HC BRONCHOSCOPY/TRANSBRONCHIAL LUNG BIOPSY: Performed by: INTERNAL MEDICINE

## 2019-05-15 PROCEDURE — 3700000000 HC ANESTHESIA ATTENDED CARE: Performed by: INTERNAL MEDICINE

## 2019-05-15 PROCEDURE — 88112 CYTOPATH CELL ENHANCE TECH: CPT

## 2019-05-15 PROCEDURE — 36415 COLL VENOUS BLD VENIPUNCTURE: CPT

## 2019-05-15 PROCEDURE — 87077 CULTURE AEROBIC IDENTIFY: CPT

## 2019-05-15 PROCEDURE — 87255 GENET VIRUS ISOLATE HSV: CPT

## 2019-05-15 PROCEDURE — 3700000001 HC ADD 15 MINUTES (ANESTHESIA): Performed by: INTERNAL MEDICINE

## 2019-05-15 PROCEDURE — 3609010800 HC BRONCHOSCOPY ALVEOLAR LAVAGE: Performed by: INTERNAL MEDICINE

## 2019-05-15 PROCEDURE — 87081 CULTURE SCREEN ONLY: CPT

## 2019-05-15 PROCEDURE — 6370000000 HC RX 637 (ALT 250 FOR IP)

## 2019-05-15 PROCEDURE — 99232 SBSQ HOSP IP/OBS MODERATE 35: CPT | Performed by: INTERNAL MEDICINE

## 2019-05-15 PROCEDURE — 0BBJ8ZX EXCISION OF LEFT LOWER LUNG LOBE, VIA NATURAL OR ARTIFICIAL OPENING ENDOSCOPIC, DIAGNOSTIC: ICD-10-PCS | Performed by: INTERNAL MEDICINE

## 2019-05-15 PROCEDURE — 0B9J8ZX DRAINAGE OF LEFT LOWER LUNG LOBE, VIA NATURAL OR ARTIFICIAL OPENING ENDOSCOPIC, DIAGNOSTIC: ICD-10-PCS | Performed by: INTERNAL MEDICINE

## 2019-05-15 PROCEDURE — 6370000000 HC RX 637 (ALT 250 FOR IP): Performed by: INTERNAL MEDICINE

## 2019-05-15 PROCEDURE — 2709999900 HC NON-CHARGEABLE SUPPLY: Performed by: INTERNAL MEDICINE

## 2019-05-15 PROCEDURE — 87186 SC STD MICRODIL/AGAR DIL: CPT

## 2019-05-15 PROCEDURE — 31624 DX BRONCHOSCOPE/LAVAGE: CPT | Performed by: INTERNAL MEDICINE

## 2019-05-15 PROCEDURE — 2500000003 HC RX 250 WO HCPCS

## 2019-05-15 PROCEDURE — 89051 BODY FLUID CELL COUNT: CPT

## 2019-05-15 PROCEDURE — 87205 SMEAR GRAM STAIN: CPT

## 2019-05-15 PROCEDURE — 6360000002 HC RX W HCPCS: Performed by: NURSE ANESTHETIST, CERTIFIED REGISTERED

## 2019-05-15 PROCEDURE — 0BDB8ZX EXTRACTION OF LEFT LOWER LOBE BRONCHUS, VIA NATURAL OR ARTIFICIAL OPENING ENDOSCOPIC, DIAGNOSTIC: ICD-10-PCS | Performed by: INTERNAL MEDICINE

## 2019-05-15 PROCEDURE — 87116 MYCOBACTERIA CULTURE: CPT

## 2019-05-15 PROCEDURE — 88312 SPECIAL STAINS GROUP 1: CPT

## 2019-05-15 PROCEDURE — 80048 BASIC METABOLIC PNL TOTAL CA: CPT

## 2019-05-15 PROCEDURE — 1200000003 HC TELEMETRY R&B

## 2019-05-15 PROCEDURE — 87206 SMEAR FLUORESCENT/ACID STAI: CPT

## 2019-05-15 PROCEDURE — 3609027000 HC BRONCHOSCOPY: Performed by: INTERNAL MEDICINE

## 2019-05-15 PROCEDURE — APPSS30 APP SPLIT SHARED TIME 16-30 MINUTES: Performed by: NURSE PRACTITIONER

## 2019-05-15 PROCEDURE — 87070 CULTURE OTHR SPECIMN AEROBIC: CPT

## 2019-05-15 PROCEDURE — 88108 CYTOPATH CONCENTRATE TECH: CPT

## 2019-05-15 PROCEDURE — 93306 TTE W/DOPPLER COMPLETE: CPT

## 2019-05-15 PROCEDURE — 87102 FUNGUS ISOLATION CULTURE: CPT

## 2019-05-15 PROCEDURE — 88305 TISSUE EXAM BY PATHOLOGIST: CPT

## 2019-05-15 PROCEDURE — 7100000001 HC PACU RECOVERY - ADDTL 15 MIN: Performed by: INTERNAL MEDICINE

## 2019-05-15 PROCEDURE — 31628 BRONCHOSCOPY/LUNG BX EACH: CPT | Performed by: INTERNAL MEDICINE

## 2019-05-15 PROCEDURE — 2500000003 HC RX 250 WO HCPCS: Performed by: NURSE ANESTHETIST, CERTIFIED REGISTERED

## 2019-05-15 PROCEDURE — 2580000003 HC RX 258: Performed by: INTERNAL MEDICINE

## 2019-05-15 PROCEDURE — 71045 X-RAY EXAM CHEST 1 VIEW: CPT

## 2019-05-15 PROCEDURE — 7100000000 HC PACU RECOVERY - FIRST 15 MIN: Performed by: INTERNAL MEDICINE

## 2019-05-15 RX ORDER — SODIUM CHLORIDE 450 MG/100ML
INJECTION, SOLUTION INTRAVENOUS CONTINUOUS
Status: DISCONTINUED | OUTPATIENT
Start: 2019-05-15 | End: 2019-05-16 | Stop reason: HOSPADM

## 2019-05-15 RX ORDER — PROPOFOL 10 MG/ML
INJECTION, EMULSION INTRAVENOUS PRN
Status: DISCONTINUED | OUTPATIENT
Start: 2019-05-15 | End: 2019-05-15 | Stop reason: SDUPTHER

## 2019-05-15 RX ORDER — KETAMINE HYDROCHLORIDE 50 MG/ML
INJECTION, SOLUTION, CONCENTRATE INTRAMUSCULAR; INTRAVENOUS PRN
Status: DISCONTINUED | OUTPATIENT
Start: 2019-05-15 | End: 2019-05-15 | Stop reason: SDUPTHER

## 2019-05-15 RX ORDER — LIDOCAINE HYDROCHLORIDE 20 MG/ML
INJECTION, SOLUTION INFILTRATION; PERINEURAL PRN
Status: DISCONTINUED | OUTPATIENT
Start: 2019-05-15 | End: 2019-05-15 | Stop reason: SDUPTHER

## 2019-05-15 RX ADMIN — Medication 10 ML: at 21:45

## 2019-05-15 RX ADMIN — ACETAMINOPHEN 650 MG: 325 TABLET ORAL at 22:49

## 2019-05-15 RX ADMIN — KETAMINE HYDROCHLORIDE 25 MG: 50 INJECTION, SOLUTION INTRAMUSCULAR; INTRAVENOUS at 13:34

## 2019-05-15 RX ADMIN — SODIUM CHLORIDE: 4.5 INJECTION, SOLUTION INTRAVENOUS at 13:07

## 2019-05-15 RX ADMIN — PROPOFOL 180 MG: 10 INJECTION, EMULSION INTRAVENOUS at 13:34

## 2019-05-15 RX ADMIN — FAMOTIDINE 20 MG: 20 TABLET ORAL at 07:57

## 2019-05-15 RX ADMIN — LOSARTAN POTASSIUM 25 MG: 25 TABLET, FILM COATED ORAL at 07:57

## 2019-05-15 RX ADMIN — SIMVASTATIN 20 MG: 20 TABLET, FILM COATED ORAL at 21:44

## 2019-05-15 RX ADMIN — AMLODIPINE BESYLATE 10 MG: 10 TABLET ORAL at 07:57

## 2019-05-15 RX ADMIN — ASPIRIN 81 MG 81 MG: 81 TABLET ORAL at 07:57

## 2019-05-15 RX ADMIN — FLUOXETINE HYDROCHLORIDE 40 MG: 20 CAPSULE ORAL at 07:57

## 2019-05-15 RX ADMIN — LIDOCAINE HYDROCHLORIDE 100 MG: 20 INJECTION, SOLUTION INFILTRATION; PERINEURAL at 13:34

## 2019-05-15 RX ADMIN — Medication 10 ML: at 07:57

## 2019-05-15 RX ADMIN — ACETAMINOPHEN 650 MG: 325 TABLET ORAL at 18:26

## 2019-05-15 ASSESSMENT — PAIN SCALES - GENERAL
PAINLEVEL_OUTOF10: 0
PAINLEVEL_OUTOF10: 7
PAINLEVEL_OUTOF10: 0
PAINLEVEL_OUTOF10: 0

## 2019-05-15 ASSESSMENT — ENCOUNTER SYMPTOMS: SHORTNESS OF BREATH: 1

## 2019-05-15 NOTE — PLAN OF CARE
Problem: Falls - Risk of:  Goal: Will remain free from falls  Description  Will remain free from falls  Outcome: Ongoing  Note:   No falls noted this shift. Continue falling star program. Bed alarm on, bed in low position. Call light and personal belongings in reach. Patient uses call light appropriately. No falls this admission          Problem: Falls - Risk of:  Goal: Will remain free from falls  Description  Will remain free from falls  Outcome: Ongoing  Note:   No falls noted this shift. Continue falling star program. Bed alarm on, bed in low position. Call light and personal belongings in reach. Patient uses call light appropriately. No falls this admission       Goal: Absence of physical injury  Description  Absence of physical injury  Outcome: Ongoing     Problem: Skin Integrity:  Goal: Absence of new skin breakdown  Description  Absence of new skin breakdown  Outcome: Ongoing  Note:   No new signs or symptoms of skin breakdown noted this shift, encouraging patient to turn and reposition self in bed q2h       Problem: Discharge Planning:  Goal: Discharged to appropriate level of care  Description  Discharged to appropriate level of care  Outcome: Ongoing  Note:   Patient plans to discharge home with      Problem: Pain:  Goal: Pain level will decrease  Description  Pain level will decrease  Outcome: Ongoing  Note:   Patient denies pain at this time. Problem: Gas Exchange - Impaired:  Goal: Levels of oxygenation will improve  Description  Levels of oxygenation will improve  Outcome: Ongoing  Note:   Oxygen saturation has been 91% or greater, will continue to monitor. Problem: Nutrition  Goal: Optimal nutrition therapy  Outcome: Ongoing     Care plan reviewed with patient. Patient verbalize understanding of the plan of care and contribute to goal setting.

## 2019-05-15 NOTE — ANESTHESIA POSTPROCEDURE EVALUATION
Department of Anesthesiology  Postprocedure Note    Patient: Fernanda Johnson  MRN: [id number]  YOB: 1940  Date of evaluation: 5/15/2019  Time:  2:14 PM     Procedure Summary     Date:  05/15/19 Room / Location:  [facility] / [facility]    Anesthesia Start:  8389 Anesthesia Stop:  0674    Procedures:       BRONCHOSCOPY (N/A )      BRONCHOSCOPY ALVEOLAR LAVAGE      BRONCHOSCOPY/TRANSBRONCHIAL LUNG BIOPSY Diagnosis:  (HYPOXIA)    Surgeon:  Richard Selby MD Responsible Provider:  Mary Harris MD    Anesthesia Type:  MAC ASA Status:  3          Anesthesia Type: MAC    Soheila Phase I:      Soheila Phase II:      Last vitals: Reviewed and per EMR flowsheets. Anesthesia Post Evaluation    Patient location during evaluation: bedside  Patient participation: complete - patient participated  Level of consciousness: awake and alert  Pain score: 0  Airway patency: patent  Nausea & Vomiting: no nausea and no vomiting  Complications: no  Cardiovascular status: hemodynamically stable  Respiratory status: spontaneous ventilation and acceptable  Hydration status: euvolemic  Comments: Patient alert and oriented prior to leaving the room. Patient continues to cough non productive.

## 2019-05-15 NOTE — ANESTHESIA PRE PROCEDURE
Department of Anesthesiology  Preprocedure Note       Name:  Maximus Santos   Age:  66 y.o.  :  1940                                          MRN:  [id number]         Date:  5/15/2019      Surgeon: Zuleyma Villalboos):  Kj Gutierrez MD    Procedure: BRONCHOSCOPY (N/A )    Medications prior to admission:   Prior to Admission medications    Medication Sig Start Date End Date Taking?  Authorizing Provider   predniSONE (DELTASONE) 20 MG tablet Take 20 mg by mouth 2 times daily Only take if having a bad day  Short of breath   Yes Historical Provider, MD   fluticasone-salmeterol (ADVAIR HFA) 230-21 MCG/ACT inhaler Inhale 1 puff into the lungs 2 times daily 19  Yes Balwinder De La Torre MD   ranitidine (ZANTAC) 150 MG tablet Take 1 tablet by mouth nightly 19  Yes Balwinder De La Torre MD   albuterol sulfate  (90 Base) MCG/ACT inhaler Inhale 2 puffs into the lungs 4 times daily as needed for Wheezing 19  Yes Balwinder De La Torre MD   FLUoxetine (PROZAC) 40 MG capsule Take 1 capsule by mouth daily 19  Yes Balwinder De La Torre MD   simvastatin (ZOCOR) 20 MG tablet Take 1 tablet by mouth nightly 19  Yes Balwinder De La Torre MD   amLODIPine (NORVASC) 10 MG tablet Take 1 tablet by mouth daily 19  Yes Balwinder De La Torre MD   losartan (COZAAR) 25 MG tablet Take 1 tablet by mouth daily 19  Yes Balwinder De La Torre MD   Multiple Vitamins-Minerals (MULTIVITAMIN ADULT PO) Take by mouth   Yes Historical Provider, MD   aspirin 81 MG tablet Take 1 tablet by mouth daily (with breakfast) 5/27/15  Yes Hien Hayden MD       Current medications:    Current Facility-Administered Medications   Medication Dose Route Frequency Provider Last Rate Last Dose    0.45 % sodium chloride infusion   Intravenous Continuous Kj Gutierrez  mL/hr at 05/15/19 1307      ipratropium-albuterol (DUONEB) nebulizer solution 1 ampule  1 ampule Inhalation Q4H PRN Kj Gutierrez MD   1 ampule at 19 9236    amLODIPine (NORVASC) tablet 10 mg  10 mg Oral Daily [facility], DO   10 mg at 05/15/19 0757    aspirin chewable tablet 81 mg  81 mg Oral Daily with breakfast [facility], DO   81 mg at 05/15/19 0757    FLUoxetine (PROZAC) capsule 40 mg  40 mg Oral Daily [facility], DO   40 mg at 05/15/19 0757    losartan (COZAAR) tablet 25 mg  25 mg Oral Daily [facility], DO   25 mg at 05/15/19 0757    famotidine (PEPCID) tablet 20 mg  20 mg Oral Daily [facility], DO   20 mg at 05/15/19 0757    simvastatin (ZOCOR) tablet 20 mg  20 mg Oral Nightly [facility], DO   20 mg at 05/14/19 2109    sodium chloride flush 0.9 % injection 10 mL  10 mL Intravenous 2 times per day [facility], DO   10 mL at 05/15/19 0757    sodium chloride flush 0.9 % injection 10 mL  10 mL Intravenous PRN [facility], DO        ondansetron [facility]) injection 4 mg  4 mg Intravenous Q6H PRN [facility], DO        [Held by provider] enoxaparin (LOVENOX) injection 40 mg  40 mg Subcutaneous Q24H [facility], DO   40 mg at 05/13/19 2235    magnesium sulfate 1 g in dextrose 5 % 100 mL IVPB  1 g Intravenous PRN [facility], DO        potassium chloride (KLOR-CON M) extended release tablet 40 mEq  40 mEq Oral PRN [facility], DO        Or    potassium bicarb-citric acid (EFFER-K) effervescent tablet 40 mEq  40 mEq Oral PRN [facility], DO        Or    potassium chloride 10 mEq/100 mL IVPB (Peripheral Line)  10 mEq Intravenous PRN [facility], DO        polyethylene glycol (GLYCOLAX) packet 17 g  17 g Oral Daily [facility], DO        acetaminophen (TYLENOL) tablet 650 mg  650 mg Oral Q4H PRN [facility], DO   650 mg at 05/14/19 8333       Allergies:  No Known Allergies    Problem List:    Patient Active Problem List   Diagnosis Code    Hyperlipidemia E78.5    Hypertension I10    TIA (transient ischemic attack) G45.9    Lumbar radiculopathy M54.16    ASCVD (arteriosclerotic cardiovascular disease) I25.10    Moderate episode of recurrent major depressive disorder (HCC) F33.1    Interstitial lung disease (HCC) J84.9    Pulmonary fibrosis (HCC) J84.10    Iron deficiency anemia secondary to inadequate dietary iron intake D50.8    Hypoxia R09.02    Pulmonary hypertension (HCC) I27.20    Severe malnutrition (HCC) E43    Shortness of breath R06.02       Past Medical History:        Diagnosis Date    Depression     Hyperlipidemia     Hypertension        Past Surgical History:        Procedure Laterality Date    BLADDER SUSPENSION      CATARACT REMOVAL WITH IMPLANT Bilateral     HYSTERECTOMY         Social History:    Social History     Tobacco Use    Smoking status: Former Smoker     Packs/day: 1.00     Years: 30.00     Pack years: 30.00     Last attempt to quit: 1994     Years since quittin.3    Smokeless tobacco: Never Used   Substance Use Topics    Alcohol use: No                                Counseling given: Not Answered      Vital Signs (Current):   Vitals:    05/15/19 0435 05/15/19 0745 05/15/19 1133 05/15/19 1304   BP: (!) 147/65 (!) 142/63 (!) 143/65 (!) 158/72   Pulse: 94  94 93   Resp:   16 16   Temp: 36.6 °C (97.9 °F) 36.8 °C (98.2 °F) 36.7 °C (98 °F)    TempSrc: Oral Oral Oral    SpO2: 91% 91% 90% 100%   Weight: 120 lb 8 oz (54.7 kg)      Height:                                                  BP Readings from Last 3 Encounters:   05/15/19 (!) 158/72   19 122/62   19 (!) 142/68       NPO Status: Time of last liquid consumption:                         Time of last solid consumption:                         Date of last liquid consumption: 19                        Date of last solid food consumption: 19    BMI:   Wt Readings from Last 3 Encounters:   05/15/19 120 lb 8 oz (54.7 kg)   19 125 lb 9.6 oz (57 kg)   19 128 lb 12.8 oz (58.4 kg)     Body mass index is 24.34 kg/m².     CBC:   Lab Results   Component Value Date    WBC 8.0 05/14/2019    RBC 4.38 05/14/2019    RBC 4.49 07/24/2017    HGB 12.5 05/14/2019    HCT 39.3 05/14/2019    MCV 89.7 05/14/2019    RDW 13.4 07/24/2017     05/14/2019       CMP:   Lab Results   Component Value Date     05/15/2019    K 3.9 05/15/2019    K 4.5 05/14/2019     05/15/2019    CO2 26 05/15/2019    BUN 11 05/15/2019    CREATININE 0.7 05/15/2019    LABGLOM 81 05/15/2019    GLUCOSE 103 05/15/2019    GLUCOSE 98 08/03/2017    PROT 7.6 05/13/2019    CALCIUM 9.3 05/15/2019    BILITOT 0.2 05/13/2019    ALKPHOS 111 05/13/2019    AST 20 05/13/2019    ALT 10 05/13/2019       POC Tests: No results for input(s): POCGLU, POCNA, POCK, POCCL, POCBUN, POCHEMO, POCHCT in the last 72 hours. Coags:   Lab Results   Component Value Date    INR 1.08 05/13/2019       HCG (If Applicable): No results found for: PREGTESTUR, PREGSERUM, HCG, HCGQUANT     ABGs: No results found for: PHART, PO2ART, DZE7ZDG, AQD7ULV, BEART, A5ODZUBN     Type & Screen (If Applicable):  No results found for: LABABO, LABRH    Anesthesia Evaluation    Airway: Mallampati: II  TM distance: >3 FB   Neck ROM: full  Mouth opening: > = 3 FB Dental: normal exam         Pulmonary:normal exam    (+) shortness of breath: chronic,                             Cardiovascular:  Exercise tolerance: poor (<4 METS),   (+) hypertension:,       ECG reviewed               Beta Blocker:  Not on Beta Blocker         Neuro/Psych:   (+) neuromuscular disease:, TIA, psychiatric history: stable with treatment            GI/Hepatic/Renal: Neg GI/Hepatic/Renal ROS            Endo/Other:    (+) : arthritis:., .                 Abdominal:           Vascular: negative vascular ROS. Anesthesia Plan      MAC     ASA 3       Induction: intravenous. Anesthetic plan and risks discussed with patient. Plan discussed with CRNA and attending.     Attending anesthesiologist reviewed and agrees with Pre Eval content              WALDEMAR Aguillon - XENA   5/15/2019

## 2019-05-16 VITALS
HEIGHT: 59 IN | DIASTOLIC BLOOD PRESSURE: 63 MMHG | OXYGEN SATURATION: 92 % | RESPIRATION RATE: 18 BRPM | WEIGHT: 121.5 LBS | TEMPERATURE: 97.8 F | BODY MASS INDEX: 24.49 KG/M2 | SYSTOLIC BLOOD PRESSURE: 133 MMHG | HEART RATE: 85 BPM

## 2019-05-16 LAB
ANGIOTENSIN CONVERTING ENZYME: 26 U/L (ref 9–67)
BAL CHARACTER: CLEAR
BAL COLLECTION SITE: ABNORMAL
BAL COLOR: COLORLESS
LYMPHOCYTES, BAL: 3 % (ref 10–15)
MACROPHAGE/MONOCYTE BAL: 14 % (ref 86–100)
PATHOLOGIST REVIEW: ABNORMAL
RBC BAL: 5 /CUMM
SEGMENTED NEUTROPHILS, BAL: 13 % (ref 0–3)
TOTAL NUCLEATED CELLS BAL: 17 /CUMM
TOTAL VOLUME RECEIVED BAL: 21 ML

## 2019-05-16 PROCEDURE — 97165 OT EVAL LOW COMPLEX 30 MIN: CPT

## 2019-05-16 PROCEDURE — 97116 GAIT TRAINING THERAPY: CPT

## 2019-05-16 PROCEDURE — 2580000003 HC RX 258: Performed by: INTERNAL MEDICINE

## 2019-05-16 PROCEDURE — 97530 THERAPEUTIC ACTIVITIES: CPT

## 2019-05-16 PROCEDURE — 6370000000 HC RX 637 (ALT 250 FOR IP): Performed by: INTERNAL MEDICINE

## 2019-05-16 PROCEDURE — 97163 PT EVAL HIGH COMPLEX 45 MIN: CPT

## 2019-05-16 PROCEDURE — 99239 HOSP IP/OBS DSCHRG MGMT >30: CPT | Performed by: INTERNAL MEDICINE

## 2019-05-16 PROCEDURE — APPSS30 APP SPLIT SHARED TIME 16-30 MINUTES: Performed by: NURSE PRACTITIONER

## 2019-05-16 PROCEDURE — 99232 SBSQ HOSP IP/OBS MODERATE 35: CPT | Performed by: INTERNAL MEDICINE

## 2019-05-16 RX ADMIN — LOSARTAN POTASSIUM 25 MG: 25 TABLET, FILM COATED ORAL at 09:00

## 2019-05-16 RX ADMIN — POLYETHYLENE GLYCOL 3350 17 G: 17 POWDER, FOR SOLUTION ORAL at 09:02

## 2019-05-16 RX ADMIN — ASPIRIN 81 MG 81 MG: 81 TABLET ORAL at 09:00

## 2019-05-16 RX ADMIN — AMLODIPINE BESYLATE 10 MG: 10 TABLET ORAL at 09:00

## 2019-05-16 RX ADMIN — FLUOXETINE HYDROCHLORIDE 40 MG: 20 CAPSULE ORAL at 09:00

## 2019-05-16 RX ADMIN — Medication 10 ML: at 09:00

## 2019-05-16 RX ADMIN — FAMOTIDINE 20 MG: 20 TABLET ORAL at 09:00

## 2019-05-16 ASSESSMENT — PAIN SCALES - GENERAL: PAINLEVEL_OUTOF10: 0

## 2019-05-16 NOTE — PROGRESS NOTES
Patient oxygen saturation rechecked, currently at 98 % . Oxygen decrease to 2L NC . Will continue to monitor.
Patient returned to floor after Bronchoscopy. Patient arrived with 4 L NC. Patient oxygen saturation at 99% , decreased oxygen to 3 L . Will continue to monitor.
famotidine  20 mg Oral Daily    simvastatin  20 mg Oral Nightly    sodium chloride flush  10 mL Intravenous 2 times per day    [Held by provider] enoxaparin  40 mg Subcutaneous Q24H    polyethylene glycol  17 g Oral Daily     ipratropium-albuterol, sodium chloride flush, ondansetron, magnesium sulfate, potassium chloride **OR** potassium alternative oral replacement **OR** potassium chloride, acetaminophen  IV Drips/Infusions   sodium chloride 100 mL/hr at 05/15/19 1307     Home Medications  Medications Prior to Admission: fluticasone-salmeterol (ADVAIR HFA) 230-21 MCG/ACT inhaler, Inhale 1 puff into the lungs 2 times daily  ranitidine (ZANTAC) 150 MG tablet, Take 1 tablet by mouth nightly  albuterol sulfate  (90 Base) MCG/ACT inhaler, Inhale 2 puffs into the lungs 4 times daily as needed for Wheezing  FLUoxetine (PROZAC) 40 MG capsule, Take 1 capsule by mouth daily  simvastatin (ZOCOR) 20 MG tablet, Take 1 tablet by mouth nightly  amLODIPine (NORVASC) 10 MG tablet, Take 1 tablet by mouth daily  losartan (COZAAR) 25 MG tablet, Take 1 tablet by mouth daily  aspirin 81 MG tablet, Take 1 tablet by mouth daily (with breakfast)  [DISCONTINUED] predniSONE (DELTASONE) 20 MG tablet, Take 20 mg by mouth 2 times daily Only take if having a bad day Short of breath  [DISCONTINUED] Multiple Vitamins-Minerals (MULTIVITAMIN ADULT PO), Take by mouth  Diet    Dietary Nutrition Supplements: Low Volume Supplement  Dietary Nutrition Supplements: Frozen Oral Supplement  DIET GENERAL;  Allergies    Patient has no known allergies.   Family History          Problem Relation Age of Onset    High Blood Pressure Mother     Heart Disease Mother     Cancer Father     Heart Disease Father      Sleep History    Never diagnosed with sleep apnea in the past.  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
are inherent in voice recognition technology. ** Final report electronically signed by Dr Memo Orellana on 5/13/2019 3:09 PM    [facility] Digital Screen Self Referral W Or Wo Cad Bilateral    Result Date: 4/22/2019  IMPRESSION: Mammogram BI-RADS: 2: Benign There is no mammographic evidence of malignancy. A 1 year screening mammogram is recommended. The patient has been entered into our database and they will receive a notification when they are due for their next exam.  The patient was notified of the results. #[id number] - [facility] DIGITAL SCREEN SELF REFERRAL W OR WO CAD BILATERAL BILATERAL DIGITAL SCREENING MAMMOGRAM 3D/2D WITH CAD: 4/22/2019 CLINICAL: Self referred screening mammogram. Tomosynthesis. Comparison is made to exams dated:  4/11/2018 mammogram and 4/5/2017 mammogram - [facility]. The tissue of both breasts is heterogeneously dense. This may lower the sensitivity of mammography. Current study was also evaluated with a Computer Aided Detection (CAD) system. There are benign scattered calcifications both breasts. There also are benign calcifications and a density right breast.  Additionally, there are benign vascular calcifications left breast.  Additionally, there alsoare benign scattered densities left breast.  No significant masses, calcifications, or other findings are seen  in either breast.  There has been no significant interval change.  Kathy Anderson M.D., rd/chichi:4/22/2019 14:31:39  Imaging Technologist: Estrellita Garcia RT(R)(M), [facility] letter sent: Normal-All Doctor Names  [id number]            DVT prophylaxis: [x] Lovenox                                 [] SCDs                                 [] SQ Heparin                                 [] Encourage ambulation           [] Already on Anticoagulation    Code Status: Full Code      PT/OT Eval Status:     Disposition:    [x] Home       [] TCU       [] Rehab       [] Psych       [] SNF       [] Long
kg/m².    SUPPLEMENTAL O2:         I/O        Intake/Output Summary (Last 24 hours) at 5/15/2019 1251  Last data filed at 5/14/2019 2107  Gross per 24 hour   Intake 1168.41 ml   Output 850 ml   Net 318.41 ml     I/O last 3 completed shifts: In: 1168.4 [P.O.:820; I.V.:348.4]  Out: 1050 [Urine:1050]   Patient Vitals for the past 96 hrs (Last 3 readings):   Weight   05/15/19 0435 120 lb 8 oz (54.7 kg)   05/13/19 2000 122 lb 9.6 oz (55.6 kg)   05/13/19 1418 125 lb (56.7 kg)       Exam   Physical Exam   Constitutional: No distress on RA. Patient appears moderately built and  moderately nourished. Head: Normocephalic and atraumatic. Mouth/Throat: Oropharynx is clear and moist.  No oral thrush. Eyes: Conjunctivae are normal. Pupils are equal, round. No scleral icterus. Neck: Neck supple. No tracheal deviation present. Cardiovascular: S1 and S2 with no murmur. No peripheral edema  Pulmonary/Chest: Normal effort with bilateral air entry, noted bilateral rales. No stridor. No respiratory distress. Patient exhibits no tenderness. Abdominal: Soft. Bowel sounds audible. No distension or tenderness to palp. Musculoskeletal: Moves all extremities. Deformed toes. Neurological: Patient is alert and oriented to person, place, and time. Skin: Warm and dry.      Labs  - Old records and notes have been reviewed in [facility]   ABG  Lab Results   Component Value Date    PH 7.45 05/13/2019    PO2 60 05/13/2019    PCO2 40 05/13/2019    HCO3 28 05/13/2019    O2SAT 91 05/13/2019     No results found for: IFIO2, MODE, SETTIDVOL, SETPEEP  CBC  Recent Labs     05/13/19  1448 05/14/19  0608   WBC 10.3 8.0   RBC 4.14* 4.38   HGB 11.9* 12.5   HCT 37.3 39.3   MCV 90.1 89.7   MCH 28.7 28.5   MCHC 31.9* 31.8*   * 420*   MPV 10.1 10.1      BMP  Recent Labs     05/13/19  1448 05/14/19  0608 05/15/19  0606    142 143   K 4.0 4.5 3.9    104 104   CO2 25 27 26   BUN 11 8 11   CREATININE 0.9 0.6 0.7   GLUCOSE 109* 105 103

## 2019-05-16 NOTE — CARE COORDINATION
5/16/19, 12:00 PM  Spoke with Bran Loza and her family; they plan home later today. Patient requested a walker p/t discharge. Updated IMM, and will follow for home oxygen needs. Notified Chris Morning of need for walker and potential home oxygen. Awaiting respiratory to complete home O2 eval.       1335 pm - Patient, , daughter updated on their payor and concern re: coverage for walker and home oxygen. Daughter and  agreed to have CM consult [employer] for home equipment needs. Called and spoke with Grecia Wagner; all information faxed to [employer] at [phone] pm. [facility] will deliver equipment to pt room. Discharge plan discussed by  and . Discharge plan reviewed with patient/ family. Patient/ family verbalize understanding of discharge plan and are in agreement with plan. Understanding was demonstrated using the teach back method.        IMM Letter  IMM Letter given to Patient/Family/Significant other/Guardian/POA/by[de-identified]   IMM Letter date given[de-identified] 05/16/19  IMM Letter time given[de-identified] 8278

## 2019-05-17 ENCOUNTER — TELEPHONE (OUTPATIENT)
Dept: PHARMACY | Facility: CLINIC | Age: 79
End: 2019-05-17

## 2019-05-17 ENCOUNTER — HOSPITAL ENCOUNTER (OUTPATIENT)
Age: 79
Discharge: HOME | End: 2019-05-17
Payer: MEDICARE

## 2019-05-17 ENCOUNTER — TELEPHONE (OUTPATIENT)
Dept: FAMILY MEDICINE CLINIC | Age: 79
End: 2019-05-17

## 2019-05-17 DIAGNOSIS — R09.02 HYPOXIA: ICD-10-CM

## 2019-05-17 DIAGNOSIS — R06.02 SHORTNESS OF BREATH: ICD-10-CM

## 2019-05-17 DIAGNOSIS — R05.3 CHRONIC COUGH: ICD-10-CM

## 2019-05-17 DIAGNOSIS — J84.9 ILD (INTERSTITIAL LUNG DISEASE) (HCC): ICD-10-CM

## 2019-05-17 DIAGNOSIS — J84.10 PULMONARY FIBROSIS (HCC): ICD-10-CM

## 2019-05-17 DIAGNOSIS — I27.20 PULMONARY HYPERTENSION (HCC): ICD-10-CM

## 2019-05-17 DIAGNOSIS — R93.89 ABNORMAL CT OF THE CHEST: ICD-10-CM

## 2019-05-17 DIAGNOSIS — Z71.89 ENCOUNTER FOR MEDICATION REVIEW AND COUNSELING: Primary | ICD-10-CM

## 2019-05-17 DIAGNOSIS — J15.1 PNEUMONIA OF BOTH LOWER LOBES DUE TO PSEUDOMONAS SPECIES (HCC): Primary | ICD-10-CM

## 2019-05-17 LAB
ANA SCREEN: DETECTED
ANTI JO-1 IGG: 0 AU/ML (ref 0–40)
ANTI RNP AB: 5 AU/ML (ref 0–40)
ANTI SSA: NORMAL
ANTI SSB: 0 AU/ML (ref 0–40)
ANTI-SMITH: 0 AU/ML (ref 0–40)
CENTROMERE AB SCREEN: 0 AU/ML (ref 0–40)
GRAM STAIN RESULT: NORMAL
RESPIRATORY CULTURE: NORMAL
SCLERODERMA (SCL-70) AB: 1 AU/ML (ref 0–40)

## 2019-05-17 PROCEDURE — 1111F DSCHRG MED/CURRENT MED MERGE: CPT | Performed by: PHARMACIST

## 2019-05-17 PROCEDURE — 86606 ASPERGILLUS ANTIBODY: CPT

## 2019-05-17 PROCEDURE — 86331 IMMUNODIFFUSION OUCHTERLONY: CPT

## 2019-05-17 RX ORDER — CIPROFLOXACIN 500 MG/1
500 TABLET, FILM COATED ORAL 2 TIMES DAILY
Qty: 28 TABLET | Refills: 0 | Status: SHIPPED | OUTPATIENT
Start: 2019-05-17 | End: 2019-05-31

## 2019-05-17 NOTE — TELEPHONE ENCOUNTER
[facility] Transitions Initial Follow Up Call    Outreach made within 2 business days of discharge: Yes    Patient: Kim Frye Patient : 1940   MRN: [id number]  Reason for Admission: Shortness of Breath, Hypoxia  Discharge Date: 19       Spoke with: Pt. , Roxanne Cruz    Discharge department/facility: [facility]. [place]    [facility] Interactive Patient Contact:  Was patient able to fill all prescriptions: Yes  Was patient instructed to bring all medications to the follow-up visit: Yes  Is patient taking all medications as directed in the discharge summary?  Yes  Does patient understand their discharge instructions: Yes  Does patient have questions or concerns that need addressed prior to 7-14 day follow up office visit: no    Scheduled appointment with PCP within 7-14 days    Follow Up  Future Appointments   Date Time Provider Rusty Manriquez   2019  1:30 PM MD BETH PerezPHOS [facility]   2019 11:40 AM Jase Cerna MD Pulm Med [facility]   2019  2:45 PM MD BETH Perez DELPHOS [facility]   2019 11:20 AM [facility] [address]   7/15/2019  1:30 PM [facility] Mendoza Martinez CMA ([address] [place] [address])

## 2019-05-17 NOTE — TELEPHONE ENCOUNTER
confirms no longer taking  · Prednisone (stop taking at discharge per MD) - confirms no longer taking    Additional Medications:  Denies    CrCl cannot be calculated (Unknown ideal weight.). Estimated Renal Function:  CrCL 48 mL/min (calculated using sCr 0.7 mg/dL, Ht 60\", IBW 45.5 kg, using C-G equation)     Ht Readings from Last 3 Encounters:   05/13/19 4' 11\" (1.499 m)   05/06/19 5' (1.524 m)   04/30/19 5' (1.524 m)      Wt Readings from Last 3 Encounters:   05/16/19 121 lb 8 oz (55.1 kg)   05/06/19 125 lb 9.6 oz (57 kg)   04/30/19 128 lb 12.8 oz (58.4 kg)      Lab Results   Component Value Date    CREATININE 0.7 05/15/2019        Assessment/Plan:  - Pt is taking medications as directed by discharging physician. Number of discrepancies: 0.     - Identified Potential Medication Interactions: No clinically significant interactions identified via [employer] Interaction Analysis as category D or higher. Noted simvastatin & amlodipine inx but current doses OK. - Renal Dosing: No renal adjustments necessary.    - Other considerations discussed:  · HTN:  Pt's  had question on timing of medications, states had previously been advised to only give BP meds if SMBP > 130 mmHg and was giving in PM instead of AM (discharge med list said to take in AM); advised  OK for patient to resume BP meds as she was prior to admission but to f/u with PCP for BP. If regularly not needing to take BP meds, consideration may be given to decrease/discontinue one med as she is currently prescribed both amlodipine 10 mg daily and losartan 25 mg daily.    - Follow up appointment date (7 days for more severe illness, 14 days for others):  5/23/2019  · Patient's  reminded of upcoming appointment  · Patient encouraged to follow up with PCP/specialist(s) as advised at discharge.     Thank you,    Jd Glynn, PharmD, [facility] Pharmacist  Direct: [phone]  Dept: [phone] (toll free [phone], option [phone])     CLINICAL PHARMACY NOTE   POST-DISCHARGE TELEPHONE FOLLOW-UP ADDENDUM    For Pharmacy Admin Tracking Only    TCM Call Made?: Yes  [facility] Select Patient?: Yes  Total # of Interventions Recommended: 1 - Updated Order #: 1  Total # Interventions Accepted: 1  Intervention Severity:   - Level 1 Intervention Present?: No   - Level 2 #: 0   - Level 3 #: 0  Outreach Status: Review Complete  Care Coordinator Outreach to Patient?: No  Provider Contacted?: No  Time Spent (min): 30

## 2019-05-18 LAB
ANA PATTERN: ABNORMAL
ANA TITER: ABNORMAL
ANTINUCLEAR AB INTERPRETIVE COMMENT: ABNORMAL
ANTINUCLEAR ANTIBODY, HEP-2, IGG: DETECTED
CYTOPLASMIC PATTERN TITER: ABNORMAL
GRAM STAIN RESULT: ABNORMAL
MISC. #1 REFERENCE GROUP TEST: NORMAL
ORGANISM: ABNORMAL
RESPIRATORY CULTURE: ABNORMAL
RESPIRATORY CULTURE: ABNORMAL

## 2019-05-19 LAB
BLOOD CULTURE, ROUTINE: NORMAL
BLOOD CULTURE, ROUTINE: NORMAL

## 2019-05-22 LAB
MISC. #1 REFERENCE GROUP TEST: NORMAL
VIRAL CULTURE,RAPID,RESPIRATOR: NORMAL
VIRAL CULTURE,RAPID,RESPIRATOR: NORMAL

## 2019-05-22 NOTE — PROGRESS NOTES
[facility]                                                     Pulmonary medicine clinic follow note after her discharge from hospital.    Patient: Braulio Armenta  : 1940      Chief complaint/[place]:  Braulio Armenta is a 66 y.o. female  was recently admitted under hospitalist service. Pulmonary medicine was consulted for management of abnormal CT chest suggestive of pulmonary fibrosis. She underwent Flexible diagnostic fiberoptic bronchoscopy with fluoroscopy. During procedure Bronch washings obtained from left lower lobe subsegments. Bronchioalveolar lavage obtained from left lower lobe lateral segment. Trans bronchial biopsies were performed from left lower lobe lateral segment under fluroscopy guidance X 4 on 5/15/2019. She came for follow up. Her bronch washings cultures grew Pseudomonas aeruginosa. She is currently on treatment with Cipro 500mg po bid- prescribed for a total of 14days.     -Braulio Armenta is currently on treatment with 2LPM of home O2 at rest and during sleep. She needs 3LPM of home O2 with exercise.     CT chest:   Dated:19  Location/Hospital:[place], [place].     History of pulmonary tuberculosis in the past: No    Recent exposure to any patients with tuberculosis:No  Recent travel to endemic places of tuberculosis:No.  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 Years of tobacco smokin.                                    Quit smoking: Yes. Quit year: Quit smoking 30years back. Current smoker: No.       .   History of recreational or IV drug use in the past:NO     History of exposure to coal mines/coal dust: NO  History of exposure to foundry dust/welding: NO  History of exposure to quarry/silica/sandblasting: NO  History of exposure to asbestos/working with breaks/ships: NO  History of exposure to farm dust: Yes  History of recent travel to long distances: NO  History of exposure to birds, pigeons, or chickens in the past:NO  Pet animals at home:Yes  Dogs: 0  Cats: 1     History of pulmonary embolism in the past: No           History of DVT in the past:No          Review of Systems:   General/Constitutional: she lost 10lbs of weight from the last visit with normal appetite. No fever or chills. HENT: Negative. Eyes: Negative. Upper respiratory tract: No nasal stuffiness or post nasal drip. Lower respiratory tract/ lungs: Occasional cough with clear sputum production. No hemoptysis. Cardiovascular: No palpitations or chest pain. Gastrointestinal: No nausea or vomiting. Neurological: No focal neurologiacal weakness. Extremities: No edema. Musculoskeletal: No complaints. Genitourinary: No complaints. Hematological: Negative. Psychiatric/Behavioral: Negative. Skin: No itching. Current Medications:      Past Medical History:   Diagnosis Date    Depression     Hyperlipidemia     Hypertension        Past Surgical History:   Procedure Laterality Date    BLADDER SUSPENSION      BRONCHOSCOPY N/A 5/15/2019    BRONCHOSCOPY performed by Jose Antonio Ray MD at [facility]    BRONCHOSCOPY  5/15/2019    BRONCHOSCOPY ALVEOLAR LAVAGE performed by [facility] MD at [facility]    BRONCHOSCOPY  5/15/2019    BRONCHOSCOPY/TRANSBRONCHIAL LUNG BIOPSY performed by [facility] MD at [facility]    CATARACT REMOVAL WITH IMPLANT Bilateral     HYSTERECTOMY         No Known Allergies    Current Outpatient Medications   Medication Sig Dispense Refill    Handicap Placard MISC by Does not apply route Dx:   Interstitial lung disease  Duration:  5 years  Expiration:  5/23/2024 1 each 0    Misc. Devices (BATH/SHOWER SEAT) MISC Disp:  1 shower chair/seat  Dx: Interstitial lung disease 1 each 0    FLUoxetine (PROZAC) 40 MG capsule Take 1 capsule by mouth daily 90 capsule 1    ciprofloxacin (CIPRO) 500 MG tablet Take 1 tablet by mouth 2 times daily for 14 days 28 tablet 0    fluticasone-salmeterol (ADVAIR HFA) 230-21 MCG/ACT inhaler Inhale 1 puff into the lungs 2 times daily 1 Inhaler 0    ranitidine (ZANTAC) 150 MG tablet Take 1 tablet by mouth nightly 90 tablet 1    albuterol sulfate  (90 Base) MCG/ACT inhaler Inhale 2 puffs into the lungs 4 times daily as needed for Wheezing 1 Inhaler 0    simvastatin (ZOCOR) 20 MG tablet Take 1 tablet by mouth nightly 90 tablet 1    amLODIPine (NORVASC) 10 MG tablet Take 1 tablet by mouth daily 90 tablet 1    losartan (COZAAR) 25 MG tablet Take 1 tablet by mouth daily 90 tablet 1    aspirin 81 MG tablet Take 1 tablet by mouth daily (with breakfast) 90 tablet 1     No current facility-administered medications for this visit. Family History   Problem Relation Age of Onset    High Blood Pressure Mother     Heart Disease Mother     Cancer Father     Heart Disease Father            Physical Exam     VITALS:  /62 (Site: Left Upper Arm, Position: Sitting, Cuff Size: Large Adult)   Pulse 79   Temp 98.3 °F (36.8 °C)   Ht 4' 11\" (1.499 m)   Wt 122 lb (55.3 kg)   SpO2 92% Comment: on 3 liters O2  BMI 24.64 kg/m²   Nursing note and vitals reviewed. Constitutional: Patient appears ill built and ill nourished. No distress. Patient is oriented to person, place, and time. HENT:   Head: Normocephalic and atraumatic. Right Ear: External ear normal.   Left Ear: External ear normal.   Mouth/Throat: Oropharynx is clear and moist.  No oral thrush. Eyes: Conjunctivae are normal. Pupils are equal, round, and reactive to light. No scleral icterus. Neck: Neck supple. No JVD present. No tracheal deviation present.    Cardiovascular: Normal rate, regular rhythm, normal heart sounds. No murmur heard. Pulmonary/Chest: Effort normal and breath sounds normal. No stridor. No respiratory distress. No wheezes. Bilateral basal rales. Patient exhibits no tenderness. Abdominal: Soft. Patient exhibits no distension. No tenderness. Musculoskeletal: Normal range of motion. Extremities: Patient exhibits no edema and no tenderness. Lymphadenopathy:  No cervical adenopathy. Neurological: Patient is alert and oriented to person, place, and time. Skin: Skin is warm and dry. Patient is not diaphoretic. Psychiatric: Patient  has a normal mood and affect. Patient behavior is normal.       Diagnostic Data:    Radiological Data:  CXR  May 13, 2019   PROCEDURE: XR CHEST PORTABLE   1. Focal area of increased density at the left lung base which could represent a developing infiltrate or atelectasis. 2. Chronic pulmonary fibrotic changes.        CT Scans  (See actual reports for details)           Pulmonary function tests:  Not done so far. Connective tissue work up:  Connective tissue work up results:  Date:    CURT( Antinuclear antibody):  Detected-Homogeneous Pattern   Cytoplasmic Pattern Titer Abnormal  05/18/2019  6:55 PM [facility]   1:160    Comment:     Anti Centromere Ab screen: negative  Anti RNP (COLLETTE-Ab):     negative  Anti Scleroderma (SCL-70):  negative  Anti Carina-1:    negative  Anti Smith antibody:   negative  Anti SSA Ro:   negative  Anti SSB:  negative      RA ( Rheumatoid factor):  Negative. <10  ACE(  level):    negative  ESR ( Sed rate): 85- elevated    Hypersensitive Pneumonitis panel:   MISC.  #1 REFERENCE GROUP TEST 05/17/2019 10:53 AM [facility]   SEE BELOW    Comment:   Test name                     Result Flag Units  RefRange   -------------------------------------------------------------   A. fumigatus #1 Ab, Precipitin                          None Detected             None Detected   A. fumigatus #6 Ab, Precipitin                          None Detected             None Detected   A. pullulans Ab, Precipitin                          None Detected             None Detected   Testing includes antibodies directed at Aureobasidium pullulans,   Aspergillus fumigatus #1, Aspergillus fumigatus #6, Micropolyspora   faeni, [place] Serum and Thermoactinomyces vulgaris #1. [place] Serum Ab, Precipitin                          None Detected             None Detected   M. faeni Ab, Precipitin                          None Detected             None Detected   T. vulgaris #1 Ab, Precipitin                          None Detected             None Detected   Performed by [place] [id number], [address] [facility] [phone]   www. Cecil Amato MD - Lab. Director         Bronchoscopy with Dr. Nazario Mcguire 5/15/19  Endobronchial findings:   Trachea: Normal mucosa. Abby: Normal mucosa  Right main bronchus: Normal mucosa  Right upper lobe bronchus: Normal mucosa  Right Middle lobe bronchus: Normal mucosa  Right Lower lobe bronchus:Normal mucosa  Left main bronchus: Normal mucosa  Left upper lobe bronchus: Normal mucosa  Left lower lobe bronchus: Normal mucosa     Bronchial washing   Respiratory culture x2-Pseudomonas aeruginosa. Susceptibility     Pseudomonas aeruginosa (1)     Antibiotic Interpretation CYNTHIA Status    gentamicin Sensitive <=1 mcg/mL Final    cefTAZidime Sensitive =4 mcg/mL Final    tobramycin Sensitive <=1 mcg/mL Final    ciprofloxacin Sensitive =0.19 mcg/mL Final        Fungus culture x2-No fungus observed  Cytology pending. Bronchoscopy results:  Hospital performed: [address] [address]. Date of procedure: 5/15/2019  Procedure: Flexible diagnostic fiberoptic bronchoscopy with fluoroscopy. During procedure Bronch washings obtained from left lower lobe subsegments. Bronchioalveolar lavage obtained from left lower lobe lateral segment.  Trans bronchial biopsies were performed from left lower lobe lateral segment under Negative for malignancy. Home O2 eval done on 5/16/19:    Home O2 eval completed required 2 LPM at rest and 3 LPM with exertion.     Patient was evaluated today for the diagnosis of ILD I entered a DME order for home oxygen because the diagnosis and testing requires the patient to have supplemental oxygen. Condition will improve or be benefited by oxygen use. The patient is  able to perform good mobility in a home setting and therefore does require the use of a portable oxygen system. The need for this equipment was discussed with the patient and she understands and is in agreement. Home O2 eval prior to discharge from hospital on 5/16/19:  A home oxygen evaluation has been completed.      [x]Patient is an inpatient. It is expected that the patient will be discharged within the next 48 hours. Qualified provider to write order for home prescription if patient qualifies. Social service/care managers will arrange for home oxygen. If patient is active, arrange for Home Medical supplier to assess for Oxygen Conserving Device per pulse oximetry. []Patient is an outpatient. Results will be faxed to the ordering provider. Qualified provider to write order for home prescription if patient qualifies and arranges for home oxygen.        Patient was placed on room air for 20 minutes. SpO2 was 87 % on room air at rest. Patients SpO2 was below 89% and qualified for home oxygen. Oxygen was applied at 1 lpm via nasal cannula to maintain a SpO2 of 89 and 2lnc to maintain between 90-92% while at rest. Actual SpO2 was 92 % on 2lnc. Patient can ambulate for exercise flow rate. Patients was ambulated, SpO2 was 89% on 2 lpm, 92% on 3lpm to maintain SpO2 between 90-92% while exercising.        Note: For any SpO2 at 14% see policy and procedure for possible qualifications.         Assessment:  -Interstitial lung disease of uncertain Etiology.  Differential includes due to Non specific Interstitial Pneumonitis Vs Pseudomonas pneumonia. -Pseudomonas aeruginosa infection of lungs. She is currently on treatment with Cipro. -Positive CURT with homogenous pattern along with elevated ESR level- ? SLE- need further evalaution  -Dyspnea due to interstitial lung disease.  -Possible COPD with history of tobacco abuse- did not have PFTS so far  -Depression.  -Hypertension under control with meds. -Hyperlipidemia.  -Chronic diastolic CHF per primary service. Recommendations/Plan:  -Stop Advair until her PFTS were available. -Continue Albuterol HFA 90mcg/Spray MDI, 2puffs  Q6Hprn. She  was informed about adverse effects of Albuterol HFA. She verbalizes understanding.  -Will reschedule patient for full pulmonary function tests before next clinic visit.  -Schedule patient for Rheumatology Consultation as soon as possible for further evaluation of Positive CURT with homogenous pattern along with elevated ESR level- ? SLE. She had interstitial lung disease of ? Etiology. ? NSIP related to connective tissue diseases.  -Early Rung needs 2LPM home O2 at rest and during sleep. She needs 3LPM of home O2 with exercise.  -Early Rung was advised to make early appointment with my clinic if she develops any constitutional symptoms including loss of weight, poor appetite or hemoptysis. She verbalizes under standing.  -Refer patient to [facility] Pulmonary department I.e Attn to Dr. Nora Ruano MD or Dr. Veronique Kogn for further evaluation and management of Interstitial lung disease of uncertain Etiology as soon as possible. Differential includes due to Non specific Interstitial Pneumonitis. ? Need for open lung biopsy.   -Patient, her daughter Ms Lucretia Flaherty and her  were educated about my impression and plan. They verbalizes understanding.  - Schedule patient for follow up with my clinic in 3months with letters from [facility] rheumatology and pulmonology. Patient advised to make early appointment if needed.       Total time spent interviewing the patient and/or family, evaluating lab data and X-ray data and processing orders was 45 Minutes. I personally spent more than 50% of the appointment time face to face with the patient providing counseling and coordinating the patient's care. Addendum done on 6/18/19 at 5:54 PM:    She was evaluated by me on 5/28/19   Leisa Rodriguez requires a bath bench with back due to being physically incapable of utilizing regular bath room due to underlying interstitial lung disease. Current body weight is Weight: 122 lb (55.3 kg).

## 2019-05-23 ENCOUNTER — OFFICE VISIT (OUTPATIENT)
Dept: FAMILY MEDICINE CLINIC | Age: 79
End: 2019-05-23
Payer: MEDICARE

## 2019-05-23 ENCOUNTER — CARE COORDINATION (OUTPATIENT)
Dept: CARE COORDINATION | Age: 79
End: 2019-05-23

## 2019-05-23 VITALS
HEART RATE: 84 BPM | SYSTOLIC BLOOD PRESSURE: 108 MMHG | BODY MASS INDEX: 24.76 KG/M2 | HEIGHT: 59 IN | WEIGHT: 122.8 LBS | DIASTOLIC BLOOD PRESSURE: 56 MMHG | OXYGEN SATURATION: 91 % | RESPIRATION RATE: 20 BRPM

## 2019-05-23 DIAGNOSIS — I10 ESSENTIAL HYPERTENSION: ICD-10-CM

## 2019-05-23 DIAGNOSIS — I27.20 PULMONARY HYPERTENSION (HCC): ICD-10-CM

## 2019-05-23 DIAGNOSIS — J84.10 PULMONARY FIBROSIS (HCC): ICD-10-CM

## 2019-05-23 DIAGNOSIS — F33.42 RECURRENT MAJOR DEPRESSIVE DISORDER, IN FULL REMISSION (HCC): ICD-10-CM

## 2019-05-23 DIAGNOSIS — J84.9 INTERSTITIAL LUNG DISEASE (HCC): Primary | ICD-10-CM

## 2019-05-23 PROCEDURE — 1111F DSCHRG MED/CURRENT MED MERGE: CPT | Performed by: FAMILY MEDICINE

## 2019-05-23 PROCEDURE — 99495 TRANSJ CARE MGMT MOD F2F 14D: CPT | Performed by: FAMILY MEDICINE

## 2019-05-23 RX ORDER — FLUOXETINE HYDROCHLORIDE 40 MG/1
40 CAPSULE ORAL DAILY
Qty: 90 CAPSULE | Refills: 1 | Status: SHIPPED | OUTPATIENT
Start: 2019-05-23 | End: 2019-10-29 | Stop reason: SDUPTHER

## 2019-05-23 ASSESSMENT — PATIENT HEALTH QUESTIONNAIRE - PHQ9
1. LITTLE INTEREST OR PLEASURE IN DOING THINGS: 0
2. FEELING DOWN, DEPRESSED OR HOPELESS: 0
SUM OF ALL RESPONSES TO PHQ9 QUESTIONS 1 & 2: 0
SUM OF ALL RESPONSES TO PHQ QUESTIONS 1-9: 0
SUM OF ALL RESPONSES TO PHQ QUESTIONS 1-9: 0

## 2019-05-23 NOTE — CARE COORDINATION
Ambulatory Care Coordination Note  5/23/2019  CM Risk Score: 0  Panda Mortality Risk Score: 19    ACC: Katya Jauregui, RN    Summary Note: PCP referral for care coordination. Met with Kip Giron, daughter, and  during PCP appointment. Introduced self and role. Recently admitted for SOB and pulmonary fibrosis. Following with Dr. Isis Le. Using Advair and albuterol. Was told to walk 3 times daily by pulm. Discussed the importance of exercise. Daughter states she has to [facility] her\" walk. Patient doesn't want to walk because she is tired or weak. States her appetite has improved so does feel a little stronger. Suggested nutritional shakes to supplement between meals. Suggested to set an alarm to remind herself to walk. Uses concentrator at home and has enough tubing to walk around. Suggested using walker as she will have more steadiness and confidence. Patient agreed. [facility] referral for Adviar. Co-pay is $90.  Contact information provided. Plan:  Early symptom recognition and reporting for any changes in breathing or s/s infection. Use same day appointments. Set an alarm and use walker to walk around house 3 times daily as tolerated. Drink supplemental shakes between meals. Cynthia Moseleychad will contact for MAP for advair. Call with any issues. Keep appointments. Ambulatory Care Coordination Assessment    Care Coordination Protocol  Program Enrollment:  Rising Risk  Referral from Primary Care Provider:  Yes  Week 1 - Initial Assessment     Do you have all of your prescriptions and are they filled?:  Yes  Barriers to medication adherence:  None  Are you able to afford your medications?:  No  How often do you have trouble taking your medications the way you have been told to take them?:  I always take them as prescribed.      Do you have Home O2 Therapy?:  Yes   Oxygen Regimen:  Continuous    Method of Delivery:  Nasal Cannula      Ability to seek help/take action for Emergent Urgent situations daily activities impact on the patient's well-being? (include current or anticipated unemployment, work, caregiving, access to transportation or other):  Some general dissatisfaction but no concern   How would you rate their social network (family, work, friends)?:  Adequate participation with social networks   How would you rate their financial resources (including ability to afford all required medical care)?:  Financially secure, some resource challenges   How wells does the patient now understand their health and well-being (symptoms, signs or risk factors) and what they need to do to manage their health?:  Reasonable to good understanding and already engages in managing health or is willing to undertake better management   How well do you think your patient can engage in healthcare discussions? (Barriers include language, deafness, aphasia, alcohol or drug problems, learning difficulties, concentration):  Clear and open communication, no identified barriers   Do other services need to be involved to help this patient?:  Other care/services not required at this time   Are current services involved with this patient well-coordinated? (Include coordination with other services you are now recommendation):  Required care/services missing and/or fragmented   Suggested Interventions and Community Resources   Home Health Services:  Declined   Medication Assistance Program:  Completed   Other Therapy Services:  Completed         Set up/Review an Education Plan, Set up/Review Goals              Prior to Admission medications    Medication Sig Start Date End Date Taking?  Authorizing Provider   ciprofloxacin (CIPRO) 500 MG tablet Take 1 tablet by mouth 2 times daily for 14 days 5/17/19 5/31/19  Jose Antonio Ray MD   fluticasone-salmeterol (ADVAIR HFA) 230-21 MCG/ACT inhaler Inhale 1 puff into the lungs 2 times daily 5/6/19   Minna Eubanks MD   ranitidine (ZANTAC) 150 MG tablet Take 1 tablet by mouth nightly 4/30/19   Farzaneh Gutierrez MD   albuterol sulfate  (90 Base) MCG/ACT inhaler Inhale 2 puffs into the lungs 4 times daily as needed for Wheezing 4/30/19   Farzaneh Gutierrez MD   FLUoxetine (PROZAC) 40 MG capsule Take 1 capsule by mouth daily 4/30/19   Farzaneh Gutierrez MD   simvastatin (ZOCOR) 20 MG tablet Take 1 tablet by mouth nightly 1/14/19   Farzaneh Gutierrez MD   amLODIPine (NORVASC) 10 MG tablet Take 1 tablet by mouth daily 1/14/19   Farzaneh Gutierrez MD   losartan (COZAAR) 25 MG tablet Take 1 tablet by mouth daily 1/14/19   Farzaneh Gutierrez MD   aspirin 81 MG tablet Take 1 tablet by mouth daily (with breakfast) 5/27/15   Pavithra Colbert MD       Future Appointments   Date Time Provider Rusty Manriquez   5/28/2019 11:40 AM Romelia Kyle MD Pulm Med [facility]   5/28/2019  2:45 PM MD BETH Raymond [facility]   6/20/2019 11:20 AM Zoila Stack MD Pulm Med [address] [place] [address]   7/15/2019  1:30 PM [facility]

## 2019-05-23 NOTE — PROGRESS NOTES
Post-Discharge Transitional Care Management Services or Hospital Follow Up      Lauren Park   YOB: 1940    Date of Office Visit:  5/23/2019  Date of Hospital Admission: 5/13/19  Date of Hospital Discharge: 5/16/19  Readmission Risk Score(high >=14%.  Medium >=10%):Readmission Risk Score: 11      Care management risk score Rising risk (score 2-5) and Complex Care (Scores >=6): 0     Non face to face  following discharge, date last encounter closed (first attempt may have been earlier): 5/17/2019  3:51 PM 5/17/2019  3:51 PM    Call initiated 2 business days of discharge: Yes     Patient Active Problem List   Diagnosis    Hyperlipidemia    Hypertension    TIA (transient ischemic attack)    Lumbar radiculopathy    ASCVD (arteriosclerotic cardiovascular disease)    Moderate episode of recurrent major depressive disorder ([address]    ILD (interstitial lung disease) ([address]    Pulmonary fibrosis ([address]    Iron deficiency anemia secondary to inadequate dietary iron intake    Hypoxia    Pulmonary hypertension (HCC)    Severe malnutrition (HCC)    Shortness of breath    Chronic cough    Abnormal CT of the chest       No Known Allergies    Medications listed as ordered at the time of discharge from hospital   [employer]   Home Medication Instructions ANTONIO:    Printed on:05/23/19 0568   Medication Information                      albuterol sulfate  (90 Base) MCG/ACT inhaler  Inhale 2 puffs into the lungs 4 times daily as needed for Wheezing             amLODIPine (NORVASC) 10 MG tablet  Take 1 tablet by mouth daily             aspirin 81 MG tablet  Take 1 tablet by mouth daily (with breakfast)             ciprofloxacin (CIPRO) 500 MG tablet  Take 1 tablet by mouth 2 times daily for 14 days             FLUoxetine (PROZAC) 40 MG capsule  Take 1 capsule by mouth daily             fluticasone-salmeterol (ADVAIR HFA) 230-21 MCG/ACT inhaler  Inhale 1 puff into the lungs 2 times daily losartan (COZAAR) 25 MG tablet  Take 1 tablet by mouth daily             ranitidine (ZANTAC) 150 MG tablet  Take 1 tablet by mouth nightly             simvastatin (ZOCOR) 20 MG tablet  Take 1 tablet by mouth nightly                   Medications marked \"taking\" at this time  Outpatient Medications Marked as Taking for the 5/23/19 encounter (Office Visit) with Tim Maradiaga MD   Medication Sig Dispense Refill    ciprofloxacin (CIPRO) 500 MG tablet Take 1 tablet by mouth 2 times daily for 14 days 28 tablet 0    fluticasone-salmeterol (ADVAIR HFA) 230-21 MCG/ACT inhaler Inhale 1 puff into the lungs 2 times daily 1 Inhaler 0    ranitidine (ZANTAC) 150 MG tablet Take 1 tablet by mouth nightly 90 tablet 1    albuterol sulfate  (90 Base) MCG/ACT inhaler Inhale 2 puffs into the lungs 4 times daily as needed for Wheezing 1 Inhaler 0    FLUoxetine (PROZAC) 40 MG capsule Take 1 capsule by mouth daily 30 capsule 0    simvastatin (ZOCOR) 20 MG tablet Take 1 tablet by mouth nightly 90 tablet 1    amLODIPine (NORVASC) 10 MG tablet Take 1 tablet by mouth daily 90 tablet 1    losartan (COZAAR) 25 MG tablet Take 1 tablet by mouth daily 90 tablet 1    aspirin 81 MG tablet Take 1 tablet by mouth daily (with breakfast) 90 tablet 1        Medications patient taking as of now reconciled against medications ordered at time of hospital discharge: Yes    Chief Complaint   Patient presents with    Shortness of Breath     Hypoxia    Follow-Up from Hospital       HPI    Inpatient course: Discharge summary reviewed- see chart. Interval history/Current status:  F/u from hospital for interstitial lung disease. On 2 L/min at rest and 3 L/min with exertion for oxygen. She has not been very active lately. She is on advair twice daily. She is not needing albuterol. She is on cipro for PNA after a bronchcosopy culture was positive. Has f/u with pulm next week    Wants a rx for a shower chair.      Daughter and  are present    Review of Systems   Constitutional: Negative for chills and fever. Respiratory: Positive for shortness of breath. Negative for wheezing. Cardiovascular: Negative for chest pain and leg swelling. Vitals:    05/23/19 1327   BP: (!) 108/56   Site: Left Upper Arm   Position: Sitting   Cuff Size: Medium Adult   Pulse: 84   Resp: 20   SpO2: 91%   Weight: 122 lb 12.8 oz (55.7 kg)   Height: 4' 11\" (1.499 m)     Body mass index is 24.8 kg/m². Wt Readings from Last 3 Encounters:   05/23/19 122 lb 12.8 oz (55.7 kg)   05/16/19 121 lb 8 oz (55.1 kg)   05/06/19 125 lb 9.6 oz (57 kg)     BP Readings from Last 3 Encounters:   05/23/19 (!) 108/56   05/16/19 133/63   05/15/19 (!) 98/52       Physical Exam   Constitutional: She is oriented to person, place, and time. She appears well-developed and well-nourished. Nasal cannula in place. Cardiovascular: Normal rate and regular rhythm. Pulmonary/Chest: Effort normal. No respiratory distress. She has no decreased breath sounds. She has no wheezes. She has no rhonchi. She has rales. Neurological: She is alert and oriented to person, place, and time. Psychiatric: Her speech is normal and behavior is normal.   Vitals reviewed. Assessment/Plan:  1. Interstitial lung disease ([address]  Relatively new Dx. Now on oxygen. Sees pulm next week. Continue advair and albuterol. Continue supplemental oxygen.    -complete course of cipro for positive bronch culture. - Handicap Placard MISC; by Does not apply route Dx: Interstitial lung disease  Duration:  5 years  Expiration:  5/23/2024  Dispense: 1 each; Refill: 0  - Misc. Devices (BATH/SHOWER SEAT) MISC; Disp:  1 shower chair/seat  Dx: Interstitial lung disease  Dispense: 1 each; Refill: 0  - HI DISCHARGE MEDS RECONCILED W/ CURRENT OUTPATIENT MED LIST    2. Essential hypertension  Chronic. Controlled. On losartan and norvasc. May need meds lowered if BP stays low.    - HI DISCHARGE MEDS RECONCILED W/ CURRENT OUTPATIENT MED LIST    3. Pulmonary fibrosis (HCC)  Chronic. As above    4. Recurrent major depressive disorder, in full remission ([facility]  Chronic. Slightly worse as she is now on oxygen. Refill med. - FLUoxetine (PROZAC) 40 MG capsule; Take 1 capsule by mouth daily  Dispense: 90 capsule; Refill: 1        Will involve care coordinator.       Medical Decision Making: moderate complexity    F/u in Evelyn Obrien MD

## 2019-05-24 ASSESSMENT — ENCOUNTER SYMPTOMS
SHORTNESS OF BREATH: 1
WHEEZING: 0

## 2019-05-25 LAB
LEGIONELLA SPECIES CULTURE: NORMAL
LEGIONELLA SPECIES CULTURE: NORMAL

## 2019-05-28 ENCOUNTER — OFFICE VISIT (OUTPATIENT)
Dept: PULMONOLOGY | Age: 79
End: 2019-05-28
Payer: MEDICARE

## 2019-05-28 VITALS
HEART RATE: 79 BPM | TEMPERATURE: 98.3 F | BODY MASS INDEX: 24.6 KG/M2 | WEIGHT: 122 LBS | OXYGEN SATURATION: 92 % | HEIGHT: 59 IN | SYSTOLIC BLOOD PRESSURE: 108 MMHG | DIASTOLIC BLOOD PRESSURE: 62 MMHG

## 2019-05-28 DIAGNOSIS — J84.9 ILD (INTERSTITIAL LUNG DISEASE) (HCC): Primary | ICD-10-CM

## 2019-05-28 DIAGNOSIS — R93.89 ABNORMAL CT OF THE CHEST: ICD-10-CM

## 2019-05-28 DIAGNOSIS — R76.8 POSITIVE ANA (ANTINUCLEAR ANTIBODY): ICD-10-CM

## 2019-05-28 DIAGNOSIS — Z72.0 TOBACCO ABUSE: ICD-10-CM

## 2019-05-28 DIAGNOSIS — J15.1 PNEUMONIA OF BOTH LOWER LOBES DUE TO PSEUDOMONAS SPECIES (HCC): ICD-10-CM

## 2019-05-28 PROCEDURE — 99215 OFFICE O/P EST HI 40 MIN: CPT | Performed by: INTERNAL MEDICINE

## 2019-05-28 NOTE — PROGRESS NOTES
Neck Circumference -   13  Mallampati - 4    Lung Nodule Screening     [] Qualifies    [x] Does not qualify   [] Declined    [] Completed

## 2019-05-28 NOTE — PATIENT INSTRUCTIONS
Recommendations/Plan:  -Stop Advair until her PFTS were available. -Continue Albuterol HFA 90mcg/Spray MDI, 2puffs  Q6Hprn. She  was informed about adverse effects of Albuterol HFA. She verbalizes understanding.  -Will reschedule patient for full pulmonary function tests before next clinic visit.  -Schedule patient for Rheumatology Consultation as soon as possible for further evaluation of Positive CURT with homogenous pattern along with elevated ESR level- ? SLE. She had interstitial lung disease of ? Etiology. ? NSIP related to connective tissue diseases.  -Graeme Wolf needs 2LPM home O2 at rest and during sleep. She needs 3LPM of home O2 with exercise.  -Graeme Wolf was advised to make early appointment with my clinic if she develops any constitutional symptoms including loss of weight, poor appetite or hemoptysis. She verbalizes under standing.  -Refer patient to [facility] Pulmonary department I.e Attn to Dr. Marquita Ruano MD or Dr. Kaitlin aLyne for further evaluation and management of Interstitial lung disease of uncertain Etiology as soon as possible. Differential includes due to Non specific Interstitial Pneumonitis. ? Need for open lung biopsy.   -Patient, her daughter Ms Juana Lara and her  were educated about my impression and plan. They verbalizes understanding.  - Schedule patient for follow up with my clinic in 3months with letters from [facility] rheumatology and pulmonology. Patient advised to make early appointment if needed.

## 2019-05-29 ENCOUNTER — TELEPHONE (OUTPATIENT)
Dept: PHARMACY | Age: 79
End: 2019-05-29

## 2019-05-30 ENCOUNTER — CARE COORDINATION (OUTPATIENT)
Dept: CARE COORDINATION | Age: 79
End: 2019-05-30

## 2019-06-03 ENCOUNTER — TELEPHONE (OUTPATIENT)
Dept: PULMONOLOGY | Age: 79
End: 2019-06-03

## 2019-06-03 NOTE — TELEPHONE ENCOUNTER
Pts daughter Jovanna Guerrero called and to get in for both appts on same day the 1st available is Aug 6th. Is waiting this long ok. Please advise.

## 2019-06-06 ENCOUNTER — HOSPITAL ENCOUNTER (OUTPATIENT)
Dept: PULMONOLOGY | Age: 79
Discharge: HOME | End: 2019-06-06
Payer: MEDICARE

## 2019-06-06 DIAGNOSIS — R76.8 POSITIVE ANA (ANTINUCLEAR ANTIBODY): ICD-10-CM

## 2019-06-06 DIAGNOSIS — R93.89 ABNORMAL CT OF THE CHEST: ICD-10-CM

## 2019-06-06 DIAGNOSIS — J15.1 PNEUMONIA OF BOTH LOWER LOBES DUE TO PSEUDOMONAS SPECIES (HCC): ICD-10-CM

## 2019-06-06 DIAGNOSIS — J84.9 ILD (INTERSTITIAL LUNG DISEASE) (HCC): ICD-10-CM

## 2019-06-06 DIAGNOSIS — Z72.0 TOBACCO ABUSE: ICD-10-CM

## 2019-06-06 PROCEDURE — 94726 PLETHYSMOGRAPHY LUNG VOLUMES: CPT

## 2019-06-06 PROCEDURE — 94010 BREATHING CAPACITY TEST: CPT

## 2019-06-11 ENCOUNTER — CARE COORDINATION (OUTPATIENT)
Dept: CARE COORDINATION | Age: 79
End: 2019-06-11

## 2019-06-11 NOTE — CARE COORDINATION
Interstitial lung disease  Duration:  5 years  Expiration:  5/23/2024 5/23/19   Yifan Trevizo MD   [facility] [place]. Devices (BATH/SHOWER SEAT) MISC Disp:  1 shower chair/seat  Dx:   Interstitial lung disease 5/23/19   Yifan Trevizo MD   FLUoxetine (PROZAC) 40 MG capsule Take 1 capsule by mouth daily 5/23/19   Yifan Trevizo MD   ranitidine (ZANTAC) 150 MG tablet Take 1 tablet by mouth nightly 4/30/19   Yifan Trevizo MD   albuterol sulfate  (90 Base) MCG/ACT inhaler Inhale 2 puffs into the lungs 4 times daily as needed for Wheezing 4/30/19   Yifan Trevizo MD   simvastatin (ZOCOR) 20 MG tablet Take 1 tablet by mouth nightly 1/14/19   Yifan Trevizo MD   amLODIPine (NORVASC) 10 MG tablet Take 1 tablet by mouth daily 1/14/19   Yifan Trevizo MD   losartan (COZAAR) 25 MG tablet Take 1 tablet by mouth daily 1/14/19   Yifan Trevizo MD   aspirin 81 MG tablet Take 1 tablet by mouth daily (with breakfast) 5/27/15   Giovanny Brannon MD       Future Appointments   Date Time Provider Rusty Manriquez   7/15/2019  1:30 PM Yifan Trevizo MD [facility] [place]   8/26/2019  1:00 PM Lety Cavazos MD [facility]

## 2019-06-17 LAB
FUNGUS IDENTIFIED: NORMAL
FUNGUS IDENTIFIED: NORMAL
FUNGUS SMEAR: NORMAL
FUNGUS SMEAR: NORMAL

## 2019-06-25 ENCOUNTER — CARE COORDINATION (OUTPATIENT)
Dept: CARE COORDINATION | Age: 79
End: 2019-06-25

## 2019-06-25 NOTE — CARE COORDINATION
Ambulatory Care Coordination Note  6/25/2019  CM Risk Score: 0  Panda Mortality Risk Score: 19    ACC: Moise Curling, RN    Summary Note: Spoke with [employer]. Frustrated because she is sick of wearing and dragging oxygen around. Breathing is somewhat improving. Not using albuterol. Appointments at [facility] with pulmonologist and rheumatologist in August.  States she feels like she is taking any medications for her condition so not getting better. Discussed doing what she can now which includes exercise and staying active. Walking 3 times daily outside. Encouraged to keep up exercise. Agrees. Plan:  Exercise daily as tolerated. [facility] pulm and rheumatology in August.  Report any changes in breathing immediately. PCP appointment 7/15/19. Care Coordination Interventions    Program Enrollment:  Rising Risk  Referral from Primary Care Provider:  Yes  Suggested Interventions and [address]:  Declined  Medication Assistance Program:  Completed  Other Therapy Services:  Completed (Comment: Rosario Simeon for home oxygen)         Goals Addressed                 This Visit's Progress     Activity Plan   On track     I will increase my activity by walking 3 times daily as tolerated    Barriers: impairment:  physical: deconditioned, lack of motivation and overwhelmed by complexity of regimen  Plan for overcoming my barriers: ACC support, set alarm to remind you to walk  Confidence: 8/10  Anticipated Goal Completion Date: 8/23/19       Conditions and Symptoms   On track     I will schedule office visits, as directed by my provider. I will keep my appointment or reschedule if I have to cancel. I will notify my provider of any barriers to my plan of care. I will notify my provider of any symptoms that indicate a worsening of my condition.     Barriers: lack of motivation and overwhelmed by complexity of regimen  Plan for overcoming my barriers: ACC support  Confidence: 8/10  Anticipated Goal Completion Date: 8/23/19              Prior to Admission medications    Medication Sig Start Date End Date Taking? Authorizing Provider   FLUoxetine (PROZAC) 40 MG capsule Take 1 capsule by mouth daily 5/23/19  Yes Tom Martin MD   ranitidine (ZANTAC) 150 MG tablet Take 1 tablet by mouth nightly 4/30/19  Yes Tom Martin MD   simvastatin (ZOCOR) 20 MG tablet Take 1 tablet by mouth nightly 1/14/19  Yes Tom Martin MD   amLODIPine (NORVASC) 10 MG tablet Take 1 tablet by mouth daily 1/14/19  Yes Tom Martin MD   losartan (COZAAR) 25 MG tablet Take 1 tablet by mouth daily 1/14/19  Yes Tom Martin MD   aspirin 81 MG tablet Take 1 tablet by mouth daily (with breakfast) 5/27/15  Yes Eri Snyder MD   Handicap Placard MISC by Does not apply route Dx: Interstitial lung disease  Duration:  5 years  Expiration:  5/23/2024 5/23/19   Tom Martin MD   Misc. Devices (BATH/SHOWER SEAT) MISC Disp:  1 shower chair/seat  Dx:   Interstitial lung disease 5/23/19   Tom Martin MD   albuterol sulfate  (90 Base) MCG/ACT inhaler Inhale 2 puffs into the lungs 4 times daily as needed for Wheezing 4/30/19   Tom Martin MD       Future Appointments   Date Time Provider Rusty Manriquez   7/15/2019  1:30 PM Tom Martin MD [facility] [place]   8/26/2019  1:00 PM Gigi Quintero MD [facility]

## 2019-07-01 LAB — AFB CULTURE & SMEAR: NORMAL

## 2019-07-15 ENCOUNTER — OFFICE VISIT (OUTPATIENT)
Dept: FAMILY MEDICINE CLINIC | Age: 79
End: 2019-07-15
Payer: MEDICARE

## 2019-07-15 VITALS
WEIGHT: 118.2 LBS | HEART RATE: 81 BPM | BODY MASS INDEX: 23.83 KG/M2 | OXYGEN SATURATION: 93 % | HEIGHT: 59 IN | DIASTOLIC BLOOD PRESSURE: 72 MMHG | SYSTOLIC BLOOD PRESSURE: 112 MMHG

## 2019-07-15 DIAGNOSIS — I27.20 PULMONARY HYPERTENSION (HCC): ICD-10-CM

## 2019-07-15 DIAGNOSIS — F33.42 RECURRENT MAJOR DEPRESSIVE DISORDER, IN FULL REMISSION (HCC): ICD-10-CM

## 2019-07-15 DIAGNOSIS — F33.1 MODERATE EPISODE OF RECURRENT MAJOR DEPRESSIVE DISORDER (HCC): ICD-10-CM

## 2019-07-15 DIAGNOSIS — Z00.00 ROUTINE GENERAL MEDICAL EXAMINATION AT A HEALTH CARE FACILITY: Primary | ICD-10-CM

## 2019-07-15 DIAGNOSIS — E78.00 PURE HYPERCHOLESTEROLEMIA: ICD-10-CM

## 2019-07-15 DIAGNOSIS — I10 ESSENTIAL HYPERTENSION: ICD-10-CM

## 2019-07-15 DIAGNOSIS — J84.9 INTERSTITIAL LUNG DISEASE (HCC): ICD-10-CM

## 2019-07-15 PROCEDURE — G0439 PPPS, SUBSEQ VISIT: HCPCS | Performed by: FAMILY MEDICINE

## 2019-07-15 PROCEDURE — 99213 OFFICE O/P EST LOW 20 MIN: CPT | Performed by: FAMILY MEDICINE

## 2019-07-15 RX ORDER — LOSARTAN POTASSIUM 25 MG/1
25 TABLET ORAL DAILY
Qty: 90 TABLET | Refills: 1 | Status: SHIPPED | OUTPATIENT
Start: 2019-07-15 | End: 2020-01-13 | Stop reason: SDUPTHER

## 2019-07-15 RX ORDER — AMLODIPINE BESYLATE 10 MG/1
10 TABLET ORAL DAILY
Qty: 90 TABLET | Refills: 1 | Status: SHIPPED | OUTPATIENT
Start: 2019-07-15 | End: 2020-01-13 | Stop reason: SDUPTHER

## 2019-07-15 RX ORDER — SIMVASTATIN 20 MG
20 TABLET ORAL NIGHTLY
Qty: 90 TABLET | Refills: 1 | Status: SHIPPED | OUTPATIENT
Start: 2019-07-15 | End: 2020-01-13 | Stop reason: SDUPTHER

## 2019-07-15 ASSESSMENT — PATIENT HEALTH QUESTIONNAIRE - PHQ9
SUM OF ALL RESPONSES TO PHQ QUESTIONS 1-9: 2
SUM OF ALL RESPONSES TO PHQ QUESTIONS 1-9: 2

## 2019-07-15 ASSESSMENT — ENCOUNTER SYMPTOMS
ABDOMINAL PAIN: 0
NAUSEA: 0
SHORTNESS OF BREATH: 1
WHEEZING: 0

## 2019-07-15 ASSESSMENT — LIFESTYLE VARIABLES: HOW OFTEN DO YOU HAVE A DRINK CONTAINING ALCOHOL: 0

## 2019-07-15 NOTE — PATIENT INSTRUCTIONS
make even the most youthful senior more prone to accidents. Falls are one of the leading health risks for older people. This increased risk of falling is related to:   Aging process (eg, decreased muscle strength, slowed reflexes)   Higher incidence of chronic health problems (eg, arthritis, diabetes) that may limit mobility, agility or sensory awareness   Side effects of medicine (eg, dizziness, blurred vision)especially medicines like prescription pain medicines and drugs used to treat mental health conditions   Depending on the brittleness of your bones, the consequences of a fall can be serious and long lasting. Home Life   Research by the Association of Aging [facility]) shows that some home accidents among older adults can be prevented by making simple lifestyle changes and basic modifications and repairs to the home environment. Here are some lifestyle changes that experts recommend:   Have your hearing and vision checked regularly. Be sure to wear prescription glasses that are right for you. Speak to your doctor or pharmacist about the possible side effects of your medicines. A number of medicines can cause dizziness. If you have problems with sleep, talk to your doctor. Limit your intake of alcohol. If necessary, use a cane or walker to help maintain your balance. Wear supportive, rubber-soled shoes, even at home. If you live in a region that gets wintry weather, you may want to put special cleats on your shoes to prevent you from slipping on the snow and ice. Exercise regularly to help maintain muscle tone, agility, and balance. Always hold the banister when going up or down stairs. Also, use  bars when getting in or out of the bath or shower, or using the toilet. To avoid dizziness, get up slowly from a lying down position. Sit up first, dangling your legs for a minute or two before rising to a standing position.    Overall Home Safety Check   According to the Consumer Product Safety voice-response system. You should also make arrangements to stay in contact with someonefriend, neighbor, family memberon a regular schedule. Fire Prevention   According to the [employer]. (Smoke Alarms for Every) [facility], senior citizens are one of the two highest risk groups for death and serious injuries due to residential fires. When cooking, wear short-sleeved items, never a bulky long-sleeved robe. The [facility]'s Safety Checklist for Older Consumers emphasizes the importance of checking basements, garages, workshops and storage areas for fire hazards, such as volatile liquids, piles of old rags or clothing and overloaded circuits. Never smoke in bed or when lying down on a couch or recliner chair. Small portable electric or kerosene heaters are responsible for many home fires and should be used cautiously if at all. If you do use one, be sure to keep them away from flammable materials. In case of fire, make sure you have a pre-established emergency exit plan. Have a professional check your fireplace and other fuel-burning appliances yearly. Helping Hands   Baby boomers entering the lott years will continue to see the development of new products to help older adults live safely and independently in spite of age-related changes. Making Life More Livable  , by Leah Cabrera, lists over 1,000 products for \"living well in the mature years,\" such as bathing and mobility aids, household security devices, ergonomically designed knives and peelers, and faucet valves and knobs for temperature control. Medical supply stores and organizations are good sources of information about products that improve your quality of life and insure your safety.      Last Reviewed: November 2009 Sonja Maldonado MD   Updated: 3/7/2011     ·

## 2019-07-15 NOTE — PROGRESS NOTES
[facility] PROFESSIONAL SERVS  [facility]  [address] [address]  Dept: [phone]  Dept Fax: [phone]  Loc: [phone]  PROGRESS NOTE      Visit Date: 7/15/2019    Gene Carson is a 66 y.o. female who presents today for:  Chief Complaint   Patient presents with    6 Month Follow-Up    Hypertension    Medicare AWV       Subjective:  Hypertension   Associated symptoms include shortness of breath. Pertinent negatives include no chest pain. Hyperlipidemia   Associated symptoms include shortness of breath. Pertinent negatives include no chest pain. 6 month f/u    HTN:  On losartan and norvasc. She checks her -130s. No hx of CAD. Hypercholesterolemia:  On simvastatin. No myalgias. On aspirin. Depression: On prozac. Mood is good. Sleeping is ok. Interstitial lung disease:  Unknown cause. On 2 L/min oxygen at rest and 3 L/min when active. Going to get seen at [facility] for pulm and rheumatology. Breathing is stable.  is present    Review of Systems   Constitutional: Negative for chills and fever. Respiratory: Positive for shortness of breath. Negative for wheezing. Cardiovascular: Negative for chest pain and leg swelling. Gastrointestinal: Negative for abdominal pain and nausea.      Past Medical History:   Diagnosis Date    Depression     Hyperlipidemia     Hypertension       Past Surgical History:   Procedure Laterality Date    BLADDER SUSPENSION      BRONCHOSCOPY N/A 5/15/2019    BRONCHOSCOPY performed [facility] Meg Lozada [facility]    BRONCHOSCOPY  5/15/2019    BRONCHOSCOPY ALVEOLAR LAVAGE [facility]    BRONCHOSCOPY  5/15/2019    BRONCHOSCOPY/TRANSBRONCHIAL LUNG BIOPSY performed by Meg Lozada MD at [facility]    CATARACT REMOVAL WITH IMPLANT Bilateral     HYSTERECTOMY       Family History   Problem Relation Age of Onset    well-nourished. Cardiovascular: Normal rate and regular rhythm. No murmur heard. Pulmonary/Chest: Effort normal. No respiratory distress. She has no decreased breath sounds. She has no wheezes. She has no rhonchi. She has rales in the right middle field, the right lower field, the left middle field and the left lower field. Musculoskeletal: She exhibits edema (trace BLE). Neurological: She is alert and oriented to person, place, and time. Psychiatric: She has a normal mood and affect. Her behavior is normal.   Vitals reviewed. Lab Results   Component Value Date    WBC 8.0 05/14/2019    HGB 12.5 05/14/2019    HCT 39.3 05/14/2019     (H) 05/14/2019    CHOL 185 07/06/2018    TRIG 144 07/06/2018    HDL 45 07/06/2018    ALT 10 (L) 05/13/2019    AST 20 05/13/2019     05/15/2019    K 3.9 05/15/2019     05/15/2019    CREATININE 0.7 05/15/2019    BUN 11 05/15/2019    CO2 26 05/15/2019    TSH 0.908 04/30/2019    INR 1.08 05/13/2019    LABA1C 5.4 07/06/2018     Impression  1. Pure hypercholesterolemia  Chronic. Stable. Refill med. - simvastatin (ZOCOR) 20 MG tablet; Take 1 tablet by mouth nightly  Dispense: 90 tablet; Refill: 1  - MD OFFICE OUTPATIENT VISIT 15 MINUTES [[id number]]    2. Essential hypertension  Chronic. Well controlled. Refill meds  - amLODIPine (NORVASC) 10 MG tablet; Take 1 tablet by mouth daily  Dispense: 90 tablet; Refill: 1  - losartan (COZAAR) 25 MG tablet; Take 1 tablet by mouth daily  Dispense: 90 tablet; Refill: 1  - MD OFFICE OUTPATIENT VISIT 15 MINUTES [[id number]]    3. Recurrent major depressive disorder, in full remission ([facility]  Chronic. Controlled. Continue prozac. - MD OFFICE OUTPATIENT VISIT 15 MINUTES [[id number]]    4. Interstitial lung disease (HCC)  Chronic. Has appt at [address] [place] [address] with pulm and rheum. Continue oxygen. - MD OFFICE OUTPATIENT VISIT 15 MINUTES [[id number]]      They voiced understanding. All questions answered. They agreed with treatment plan.   Educational

## 2019-07-15 NOTE — PROGRESS NOTES
family notice any trouble with your hearing?: No  Do you have difficulty driving, watching TV, or doing any of your daily activities because of your eyesight?: No  Have you had an eye exam within the past year?: (!) No  Hearing/Vision Interventions:  · Vision concerns:  patient encouraged to make appointment with his/her eye specialist    Safety:  Safety  Do you have working smoke detectors?: Yes  Have all throw rugs been removed or fastened?: (!) No  Do you have non-slip mats or surfaces in all bathtubs/showers?: Yes  Do all of your stairways have a railing or banister?: Yes  Are your doorways, halls and stairs free of clutter?: Yes  Do you always fasten your seatbelt when you are in a car?: Yes  Safety Interventions:  · Home safety tips provided    ADL:  ADLs  In the past 7 days, did you need help from others to perform any of the following everyday activities? Eating, dressing, grooming, bathing, toileting, or walking/balance?: None  In the past 7 days, did you need help from others to take care of any of the following?  Laundry, housekeeping, banking/finances, shopping, telephone use, food preparation, transportation, or taking medications?: [employer], Shopping, Food Preparation, Transportation  ADL Interventions:  · Patient declines any further evaluation/treatment for this issue    Personalized Preventive Plan   Current Health Maintenance Status  Immunization History   Administered Date(s) Administered    Influenza 10/22/2014    Influenza Vaccine, unspecified formulation 10/04/2015, 10/02/2016    Influenza Virus Vaccine 10/04/2018    Pneumococcal Polysaccharide ([id number]) 10/04/2018        Health Maintenance   Topic Date Due    DTaP/Tdap/Td vaccine (1 - Tdap) 09/02/1959    Shingles Vaccine (1 of 2) 09/02/1990    Annual Wellness Visit (AWV)  09/02/2003    Flu vaccine (1) 09/01/2019    Pneumococcal 65+ years Vaccine (2 of 2 - PCV13) 10/04/2019    Potassium monitoring  05/15/2020    Creatinine

## 2019-07-16 ENCOUNTER — CARE COORDINATION (OUTPATIENT)
Dept: CARE COORDINATION | Age: 79
End: 2019-07-16

## 2019-07-16 NOTE — CARE COORDINATION
Ambulatory Care Coordination Note  7/16/2019  CM Risk Score: 0  Charlson 10 Year Mortality Risk Score: 98%     ACC: Yuni Anthony RN    Summary Note: Spoke with Amelia Quiñones. Saw PCP yesterday for Medicare Wellness. No changes. Continues to walk 3 times daily. Not taking albuterol. Denies wheezing or SOB with exercise. Walking outside. Isn't out for a long period of time. Does alright in heat during that time. Has pulm and rheumatology appointment at [address] [place] [address] 8/6/19. Daughter is driving her and . Plan:  Report any changes in breathing immediately. [facility] appointment 8/6/19. Use same day appointments instead of ED when appropriate. Report any signs or symptoms of illness immediately. Care Coordination Interventions    Program Enrollment:  Rising Risk  Referral from Primary Care Provider:  Yes  Suggested Interventions and [address]:  Declined  Medication Assistance Program:  Completed  Other Therapy Services:  Completed (Comment: Chely Coronado for home oxygen)         Goals Addressed                 This Visit's Progress     Activity Plan   Improving     I will increase my activity by walking 3 times daily as tolerated    Barriers: impairment:  physical: deconditioned, lack of motivation and overwhelmed by complexity of regimen  Plan for overcoming my barriers: ACC support, set alarm to remind you to walk  Confidence: 8/10  Anticipated Goal Completion Date: 8/23/19       Conditions and Symptoms   On track     I will schedule office visits, as directed by my provider. I will keep my appointment or reschedule if I have to cancel. I will notify my provider of any barriers to my plan of care. I will notify my provider of any symptoms that indicate a worsening of my condition.     Barriers: lack of motivation and overwhelmed by complexity of regimen  Plan for overcoming my barriers: ACC support  Confidence: 8/10  Anticipated Goal Completion Date: 8/23/19

## 2019-07-30 ENCOUNTER — CARE COORDINATION (OUTPATIENT)
Dept: CARE COORDINATION | Age: 79
End: 2019-07-30

## 2019-08-06 ENCOUNTER — CARE COORDINATION (OUTPATIENT)
Dept: CARE COORDINATION | Age: 79
End: 2019-08-06

## 2019-08-19 ENCOUNTER — CARE COORDINATION (OUTPATIENT)
Dept: CARE COORDINATION | Age: 79
End: 2019-08-19

## 2019-08-26 ENCOUNTER — OFFICE VISIT (OUTPATIENT)
Dept: PULMONOLOGY | Age: 79
End: 2019-08-26
Payer: MEDICARE

## 2019-08-26 VITALS
OXYGEN SATURATION: 96 % | HEIGHT: 59 IN | TEMPERATURE: 100.7 F | SYSTOLIC BLOOD PRESSURE: 118 MMHG | DIASTOLIC BLOOD PRESSURE: 64 MMHG | BODY MASS INDEX: 23.83 KG/M2 | HEART RATE: 85 BPM | WEIGHT: 118.2 LBS

## 2019-08-26 DIAGNOSIS — J84.9 ILD (INTERSTITIAL LUNG DISEASE) (HCC): Primary | ICD-10-CM

## 2019-08-26 PROCEDURE — 99214 OFFICE O/P EST MOD 30 MIN: CPT | Performed by: INTERNAL MEDICINE

## 2019-08-26 RX ORDER — OMEPRAZOLE 40 MG/1
40 CAPSULE, DELAYED RELEASE ORAL DAILY
COMMUNITY
End: 2020-01-13

## 2019-08-27 ENCOUNTER — HOSPITAL ENCOUNTER (OUTPATIENT)
Dept: CARDIAC REHAB | Age: 79
Setting detail: THERAPIES SERIES
Discharge: HOME | End: 2019-08-27
Payer: MEDICARE

## 2019-08-27 VITALS — HEIGHT: 59 IN | WEIGHT: 117 LBS | BODY MASS INDEX: 23.59 KG/M2

## 2019-08-27 PROCEDURE — 97530 THERAPEUTIC ACTIVITIES: CPT

## 2019-08-27 PROCEDURE — 97750 PHYSICAL PERFORMANCE TEST: CPT

## 2019-08-27 NOTE — PROGRESS NOTES
Use  Activity:  L/min  () Continuous  () Breath Actuated    Oxygen Titration  () Maintaining >87% Spo2  () Not maintaining -  L/min needed on     Current Home Exercise:  Frequency:  x/wk  Intensity:  /10  Duration:  min  Mode: EXERCISE  RE ASSESSMENT    Current Oxygen Use  Activity:  L/min  () Continuous  () Breath Actuated    Oxygen Titration  () Maintaining >87% Spo2  () Not maintaining -  L/min needed on     Current Home Exercise:  Frequency:  x/wk  Intensity:  /10  Duration:  min  Mode:   EXERCISE  DISCHARGE      Current Oxygen Use  Activity:  L/min  () Continuous  () Breath Actuated    Oxygen Titration  () Maintaining >87% Spo2  () Not maintaining -  L/min needed on     Current Home Exercise:  Frequency:  x/wk  Intensity:  /10  Duration:  min  Mode:       6 Minute Walk Test (6MWT):  O2 used: 3l nc with pulse flow   700 ft walked = 2.0 METs  SpO2: 86-94%  HR:    RPD: 1  RPE: 1  Number of Breaks: none   Avg time for break:  n/a  Assist device used: cane.  carried the O2 tank, pt and spouse both stated that pt always carries the O2 for the pt.         6 Minute Walk Test (6MWT):  O2 used:   ft walked =  METs  SpO2:  %  HR:    RPD:   RPE:  Number of Breaks: Avg time for break:  secs  Assist device used:             OUTCOMES:  6MWD Improvement:  > 98'?  () Yes  () No     Exercise Prescription    THR: 71 - 113 bpm        Frequency:  2-3x/wk in [place], 1-3x/wk home activity    Intensity:  METs (from 6MWT)      Time:  15-30 total minutes up to 55 minutes max    Type:  Aerobic (TM, AD, UBE, NuStep), 6 ST, RT 1-10# 8-15 reps    Progression:  Increase 30s - 2 min/mode for Aerobic activity, and increase Intensity 2-5% each week if RPE <5, RPD <5, SpO2 >87% and pt is within [facility] above.    Exercise Prescription    () Pt exercising within THR      Frequency:  2-3x/wk in [place], 1-3x/wk home activity    Intensity:  3-5 on Misael Dyspnea/ Fatigue scale    Time:  15-30 total minutes up to 55 minutes max    Type:  Aerobic min  NuStep:  W,  min  UBE:  level, 5 min  RT  #,  reps Current Levels:  TM:  Mph/ %,  min  AD:  level,  min  NuStep:  W,  min  UBE:  level, 5 min  RT  #,  reps Current Levels:  TM:  Mph/ %,  min  AD:  level,  min  NuStep:  W,  min  UBE:  level, 5 min  RT  #,  reps   Final Levels:  TM:  Mph/ %,  min  AD:  level,  min  NuStep:  W,  min  UBE:  level, 5 min  RT  #,  reps     Physical Limitations  Initial Assessment    (x) Weakness  () Inflexible  () Poor posture  () Gait abnormality  (x) Balance  () Orthopedic Issues   Physical Limitation  Plan      () Strengthen through squats and RT  () Appropriate stretches shown  ()Verbal cues and reminders for posture and gait given  () Proper modalities chosen for ortho issues  () Give Home activity / stretches/ Warmup/ [place] info   Physical Limitations Reassessment      () Pt progressing  () Pt not progressing Physical Limitations Reassessment      () Pt progressing  () Pt not progressing Physical Limitations Reassessment      () Pt progressing  () Pt not progressing     Physical Limitations  Discharge      () Issues Addressed  () PT/OT suggested       Exercise Goals   Initial Assessment:    (x) Start to, and commit to exercising regularly      (x) Learn about home activity recommendations            (x) Increase Functional Capacity shown by increasing 6MWD   Exercise Goals   Plan      -Initiate RTR 2x/week  -Encourage home exercise      -Attend Home Activity EDUCATION class          -Slowly, safely, progress in RTR    Exercise Goals   Reassessment      () Pt is coming regularly  () Pt is sporadic        () Pt has had class  () Pt has not had class          () Pt is progressing  () Pt is not progressing Exercise Goals   Reassessment      () Pt is coming regularly  () Pt is sporadic        () Pt has had class  () Pt has not had class          () Pt is progressing  () Pt is not progressing Exercise Goals   Reassessment      () Pt is coming regularly  () Pt is sporadic        () achieved?:    Next 30 day goal: Lbs  () Maintain within 5lbs NUTRITION  RE ASSESSMENT    Current Weight:  lbs      Goal achieved?:    Next 30 day goal: Lbs  () Maintain within 5lbs   NUTRITION DISCHARGE      Current Weight:  Lbs      Goal achieved?:     Nutrition Goals  Initial Assessment      () Pt is DM.   Increase nutrition knowledge and glucose control through diet and exercise      (x) Learn weight strategies to maintain weight        (x) Learn about general nutrition          -Achievable weight goal for the end of the program:  117 Lbs (2-4lbs per month recommended)   Nutrition Goals   Plan        () DM:  Attend nutrition class and learn about quality carbohydrates and exercises role in DM    () Attend nutrition class          () Attend nutrition class          () Initiate exercise and give pt hints and tips for weight and will have class for information   Nutrition Goals  Reassessment        () DM: Pt has had class  () DM: Pt has not had class           () Pt has had class  () Pt has not had class       () Pt has had class  () Pt has not had class       () Pt progressing  () Pt not progressing     Nutrition Goals  Reassessment        () DM: Pt has had class  () DM: Pt has not had class           () Pt has had class  () Pt has not had class       () Pt has had class  () Pt has not had class       () Pt progressing  () Pt not progressing Nutrition Goals  Reassessment        () DM: Pt has had class  () DM: Pt has not had class           () Pt has had class  () Pt has not had class       () Pt has had class  () Pt has not had class       () Pt progressing  () Pt not progressing     Nutrition Goals  Discharge        () Pt had class  Date:  () DM: Pt has not had class          () Pt has had class  Date: See above  () Pt has not had class    () Pt has class  Date: See above  () Pt has not had class      () Final weight goal achieved  () Pt did not reach desired weight goal - (readdressed nutrition and exercise strategies for future goal attainment)        Nutrition Intervention/ Education   Initial Assessment    (x) Pt needs Nutrition education class        () Pt states does not need class       Nutrition Intervention/ Education  Plan      () Pt will have Nutrition class          () Encourage pt to continue to learn and incorporate self knowledge in to diet choices   Nutrition Intervention/ Education  Reassessment      () Pt has had class  () Pt has not had class      () Pt had questions  () Pt denies having questions Nutrition Intervention/ Education  Reassessment      () Pt has had class  () Pt has not had class      () Pt had questions  () Pt denies having questions Nutrition Intervention/ Education  Reassessment      () Pt has had class  () Pt has not had class      () Pt had questions  () Pt denies having questions Nutrition Intervention/ Education   Discharge      () Pt has had class  Date: See above  () Pt has not had class - Reason:    () Pt had questions that were answered   () Pt denies having questions             PSYCHOSOCIAL INITIAL ASSESSMENT    Depression:  () Self Reported  (x) In History  () S/Sx noted  () Denies  (x) On Medication  (x) Follows physician        (x) Give PHQ-9 Depression screening tool                Dyspnea:  (x) Give pt [employer] SOB survey    (x) Pt gets SOB during activity and with ADLs        (x) Pt has support from home for the program      () Pt does not have support for the program   PSYCHOSOCIAL  PLAN      () Attend Stress/ Depression/ Anxiety Class                  Pre Rehab PHQ-9   Score: 2  1-4 Normal  5-9 Mild  10-14 Mod  15-19 Mod-Sev  >19 Severe      Pre Rehab [facility] SOB Score: 19    (x) Attend classes and attend rehab to increase strength and endurance    -Attend Psychosocial class        () Speak with the patient/ family about ability to commit to the program with undue stress/ anxiety   PSYCHOSOCIAL  RE ASSESSMENT    () Pt >10 on PHQ-9    Action:                   () Pt Goals  Reassessment          () Pt has had class  () Pt has not had class        () Pt has had all classes  () Pt has had most classes  () Pt has had little/ not staying for education Exacerbation Prevention & Management Goals  Reassessment          () Pt has had class  () Pt has not had class        () Pt has had all classes  () Pt has had most classes  () Pt has had little/ not staying for education Exacerbation Prevention & Management Goals  Reassessment          () Pt has had class  () Pt has not had class        () Pt has had all classes  () Pt has had most classes  () Pt has had little/ not staying for education Exacerbation Prevention & Management Goals  Discharge          () Pt had class  Date:  See above  () Pt did not have class        () Pts attended all relevant classes and all  Questions were answered                   PHYSICIAN INTERACTION    MD Initial Assessment Review    (x) Initial  Assessment Reviewed  (x) Treatment Plan and Goals support patient needs/ abilities                  Cosigned and dated below:   Forestine Hodgkin MD 30 day and Plan Review:      (x) I have been in the rehab room to see the patient during this 30 day reassessment  (x) I agree with the plan  (x) Continue with progression and instruction  () Continue, but with the following changes:      Cosigned and dated below: Forestine Hodgkin MD 30 day Reassessment Review:    (x) I have been in the rehab room to see the patient during this 30 day reassessmen  (x) Continue with the current program  () Continue, but with the following changes:  () Discharge patient      Cosigned and dated below: Forestine Hodgkin MD 30 day Reassessment Review:    (x) I have been in the rehab room to see the patient during this 30 day reassessmen  (x) Continue with the current program  () Continue, but with the following changes:  () Discharge patient      Cosigned and dated below: Forestine Hodgkin MD 30 day Reassessment Review:    (x) I have been in the rehab room to see the patient during this 30 day reassessmen  (x) Continue with the current program  () Continue, but with the following changes:  () Discharge patient      Cosigned and dated below: Suzy Uribe MD 30 day   Discharge Review:    (x) Discharge patient                            Cosigned and dated below:

## 2019-09-03 ENCOUNTER — HOSPITAL ENCOUNTER (OUTPATIENT)
Dept: CARDIAC REHAB | Age: 79
Setting detail: THERAPIES SERIES
End: 2019-09-03
Payer: MEDICARE

## 2019-09-05 ENCOUNTER — HOSPITAL ENCOUNTER (OUTPATIENT)
Dept: CARDIAC REHAB | Age: 79
Setting detail: THERAPIES SERIES
Discharge: HOME | End: 2019-09-05
Payer: MEDICARE

## 2019-09-05 PROCEDURE — 97150 GROUP THERAPEUTIC PROCEDURES: CPT

## 2019-09-09 ENCOUNTER — CARE COORDINATION (OUTPATIENT)
Dept: CARE COORDINATION | Age: 79
End: 2019-09-09

## 2019-09-09 NOTE — CARE COORDINATION
Ambulatory Care Coordination Note  9/9/2019  CM Risk Score: 0  Charlson 10 Year Mortality Risk Score: 98%     ACC: Ana Maria Guevara, RN    Summary Note: Nicolas Mir is seeing a pulmonologist and rheumatologist at [facility]. Established with pulm locally. Diagnosed with interstitial lung disease and hiatal hernia. Stable. No recent admissions or ED visits. Started pulmonary rehab recently. Wears home oxygen. No new needs or barriers. Education complete. Ready for graduation. Care Coordination Interventions    Program Enrollment:  Rising Risk  Referral from Primary Care Provider:  Yes  Suggested Interventions and [address]:  Declined  Medication Assistance Program:  Completed  Other Therapy Services:  Completed (Comment: Sarah Cummins for home oxygen)         Goals Addressed                 This Visit's Progress     COMPLETED: Activity Plan        I will increase my activity by walking 3 times daily as tolerated    Barriers: impairment:  physical: deconditioned, lack of motivation and overwhelmed by complexity of regimen  Plan for overcoming my barriers: ACC support, set alarm to remind you to walk  Confidence: 8/10  Anticipated Goal Completion Date: 8/23/19       COMPLETED: Conditions and Symptoms        I will schedule office visits, as directed by my provider. I will keep my appointment or reschedule if I have to cancel. I will notify my provider of any barriers to my plan of care. I will notify my provider of any symptoms that indicate a worsening of my condition. Barriers: lack of motivation and overwhelmed by complexity of regimen  Plan for overcoming my barriers: ACC support  Confidence: 8/10  Anticipated Goal Completion Date: 8/23/19              Prior to Admission medications    Medication Sig Start Date End Date Taking?  Authorizing Provider   omeprazole (PRILOSEC) 40 MG delayed release capsule Take 40 mg by mouth daily    Historical Provider, MD   simvastatin (ZOCOR)

## 2019-09-10 ENCOUNTER — HOSPITAL ENCOUNTER (OUTPATIENT)
Dept: CARDIAC REHAB | Age: 79
Setting detail: THERAPIES SERIES
Discharge: HOME | End: 2019-09-10
Payer: MEDICARE

## 2019-09-12 ENCOUNTER — HOSPITAL ENCOUNTER (OUTPATIENT)
Dept: CARDIAC REHAB | Age: 79
Setting detail: THERAPIES SERIES
End: 2019-09-12
Payer: MEDICARE

## 2019-09-17 ENCOUNTER — HOSPITAL ENCOUNTER (OUTPATIENT)
Dept: CARDIAC REHAB | Age: 79
Setting detail: THERAPIES SERIES
End: 2019-09-17
Payer: MEDICARE

## 2019-09-19 ENCOUNTER — HOSPITAL ENCOUNTER (OUTPATIENT)
Dept: CARDIAC REHAB | Age: 79
Setting detail: THERAPIES SERIES
End: 2019-09-19
Payer: MEDICARE

## 2019-09-23 LAB — AFB CULTURE & SMEAR: NORMAL

## 2019-09-26 ENCOUNTER — HOSPITAL ENCOUNTER (OUTPATIENT)
Dept: CARDIAC REHAB | Age: 79
Setting detail: THERAPIES SERIES
Discharge: HOME | End: 2019-09-26
Payer: MEDICARE

## 2019-09-26 PROCEDURE — 97150 GROUP THERAPEUTIC PROCEDURES: CPT

## 2019-09-26 NOTE — PROGRESS NOTES
MANAGEMENT  PLAN      Address via Disease Self Management and Exacerbation prevention class:    -Hydration for mucus    -Hand Hygiene          -Evaluation of Sputum    -When to call MD  -S/Sx to report  -Decrease exposure to irritants/ exacerbators    -Cleaning of respiratory equipment   EXACERBATION PREVENTION & MANAGEMENT  RE ASSESSMENT    () Pt has had class  () Pt has not had class      () Improved hydration    () Correct hand washing techs and alcohol gel usage    () Sputum assessment    () Ability to recognize exacerbation and when to call MD        () Cleaning of respiratory equipment EXACERBATION PREVENTION & MANAGEMENT  RE ASSESSMENT    () Pt has had class  () Pt has not had class      () Improved hydration    () Correct hand washing techs and alcohol gel usage    () Sputum assessment    () Ability to recognize exacerbation and when to call MD        () Cleaning of respiratory equipment EXACERBATION PREVENTION & MANAGEMENT  RE ASSESSMENT    () Pt has had class  () Pt has not had class      () Improved hydration    () Correct hand washing techs and alcohol gel usage    () Sputum assessment    () Ability to recognize exacerbation and when to call MD        () Cleaning of respiratory equipment EXACERBATION PREVENTION & MANAGEMENT  DISCHARGE      () Pt has had class  Date:  () Pt did not have class              () Addressed questions and pt feels comfortable with hydration, hand washing, sputum assessment, exacerbation knowledge, and cleaning of equipment   Exacerbation Prevention & Management Goals  Initial Assessment        (x) Learn ways to stay healthy and not get admitted          (x) Increase knowledge of Lungs, Breathing, Exercise, Nutrition, Mood and controlling anxiety, and stopping the Dyspnea Cycle    Exacerbation Prevention & Management Goals Intervention/ Education  Plan      -Attend class and demonstrate disease self management strategies      -Attend all appropriate classes

## 2019-10-01 ENCOUNTER — HOSPITAL ENCOUNTER (OUTPATIENT)
Dept: CARDIAC REHAB | Age: 79
Setting detail: THERAPIES SERIES
Discharge: HOME | End: 2019-10-01
Payer: MEDICARE

## 2019-10-01 PROCEDURE — 97150 GROUP THERAPEUTIC PROCEDURES: CPT

## 2019-10-03 ENCOUNTER — HOSPITAL ENCOUNTER (OUTPATIENT)
Dept: CARDIAC REHAB | Age: 79
Setting detail: THERAPIES SERIES
Discharge: HOME | End: 2019-10-03
Payer: MEDICARE

## 2019-10-03 PROCEDURE — 97530 THERAPEUTIC ACTIVITIES: CPT

## 2019-10-08 ENCOUNTER — HOSPITAL ENCOUNTER (OUTPATIENT)
Dept: CARDIAC REHAB | Age: 79
Setting detail: THERAPIES SERIES
Discharge: HOME | End: 2019-10-08
Payer: MEDICARE

## 2019-10-08 PROCEDURE — 97150 GROUP THERAPEUTIC PROCEDURES: CPT

## 2019-10-10 ENCOUNTER — HOSPITAL ENCOUNTER (OUTPATIENT)
Dept: CARDIAC REHAB | Age: 79
Setting detail: THERAPIES SERIES
End: 2019-10-10
Payer: MEDICARE

## 2019-10-15 ENCOUNTER — HOSPITAL ENCOUNTER (OUTPATIENT)
Dept: CARDIAC REHAB | Age: 79
Setting detail: THERAPIES SERIES
Discharge: HOME | End: 2019-10-15
Payer: MEDICARE

## 2019-10-15 PROCEDURE — 97150 GROUP THERAPEUTIC PROCEDURES: CPT

## 2019-10-17 ENCOUNTER — HOSPITAL ENCOUNTER (OUTPATIENT)
Dept: CARDIAC REHAB | Age: 79
Setting detail: THERAPIES SERIES
Discharge: HOME | End: 2019-10-17
Payer: MEDICARE

## 2019-10-17 PROCEDURE — 97150 GROUP THERAPEUTIC PROCEDURES: CPT

## 2019-10-22 ENCOUNTER — HOSPITAL ENCOUNTER (OUTPATIENT)
Dept: CARDIAC REHAB | Age: 79
Setting detail: THERAPIES SERIES
Discharge: HOME | End: 2019-10-22
Payer: MEDICARE

## 2019-10-22 PROCEDURE — 97750 PHYSICAL PERFORMANCE TEST: CPT

## 2019-10-22 PROCEDURE — 97150 GROUP THERAPEUTIC PROCEDURES: CPT

## 2019-10-24 ENCOUNTER — HOSPITAL ENCOUNTER (OUTPATIENT)
Dept: CARDIAC REHAB | Age: 79
Setting detail: THERAPIES SERIES
Discharge: HOME | End: 2019-10-24
Payer: MEDICARE

## 2019-10-24 PROCEDURE — 97150 GROUP THERAPEUTIC PROCEDURES: CPT

## 2019-10-27 DIAGNOSIS — K29.50 OTHER CHRONIC GASTRITIS WITHOUT HEMORRHAGE: ICD-10-CM

## 2019-10-27 DIAGNOSIS — F33.42 RECURRENT MAJOR DEPRESSIVE DISORDER, IN FULL REMISSION (HCC): ICD-10-CM

## 2019-10-29 ENCOUNTER — HOSPITAL ENCOUNTER (OUTPATIENT)
Dept: CARDIAC REHAB | Age: 79
Setting detail: THERAPIES SERIES
Discharge: HOME | End: 2019-10-29
Payer: MEDICARE

## 2019-10-29 DIAGNOSIS — K29.50 OTHER CHRONIC GASTRITIS WITHOUT HEMORRHAGE: Primary | ICD-10-CM

## 2019-10-29 DIAGNOSIS — F33.42 RECURRENT MAJOR DEPRESSIVE DISORDER, IN FULL REMISSION (HCC): ICD-10-CM

## 2019-10-29 PROCEDURE — 97150 GROUP THERAPEUTIC PROCEDURES: CPT

## 2019-10-29 RX ORDER — RANITIDINE 150 MG/1
TABLET ORAL
Qty: 90 TABLET | Refills: 0 | Status: SHIPPED | OUTPATIENT
Start: 2019-10-29 | End: 2019-10-29 | Stop reason: CLARIF

## 2019-10-29 RX ORDER — FAMOTIDINE 20 MG/1
20 TABLET, FILM COATED ORAL 2 TIMES DAILY
Qty: 180 TABLET | Refills: 1 | Status: SHIPPED | OUTPATIENT
Start: 2019-10-29 | End: 2020-01-13

## 2019-10-29 RX ORDER — FLUOXETINE HYDROCHLORIDE 40 MG/1
40 CAPSULE ORAL DAILY
Qty: 90 CAPSULE | Refills: 1 | Status: SHIPPED | OUTPATIENT
Start: 2019-10-29 | End: 2020-01-01 | Stop reason: SDUPTHER

## 2019-10-29 RX ORDER — FLUOXETINE HYDROCHLORIDE 20 MG/1
CAPSULE ORAL
Qty: 90 CAPSULE | Refills: 0 | Status: SHIPPED | OUTPATIENT
Start: 2019-10-29 | End: 2019-10-29 | Stop reason: DRUGHIGH

## 2019-10-31 ENCOUNTER — HOSPITAL ENCOUNTER (OUTPATIENT)
Dept: CARDIAC REHAB | Age: 79
Setting detail: THERAPIES SERIES
Discharge: HOME | End: 2019-10-31
Payer: MEDICARE

## 2019-10-31 PROCEDURE — 97150 GROUP THERAPEUTIC PROCEDURES: CPT

## 2019-11-05 ENCOUNTER — HOSPITAL ENCOUNTER (OUTPATIENT)
Dept: CARDIAC REHAB | Age: 79
Setting detail: THERAPIES SERIES
Discharge: HOME | End: 2019-11-05
Payer: MEDICARE

## 2019-11-05 PROCEDURE — 97150 GROUP THERAPEUTIC PROCEDURES: CPT

## 2019-11-07 ENCOUNTER — HOSPITAL ENCOUNTER (OUTPATIENT)
Dept: CARDIAC REHAB | Age: 79
Setting detail: THERAPIES SERIES
Discharge: HOME | End: 2019-11-07
Payer: MEDICARE

## 2019-11-07 PROCEDURE — 97150 GROUP THERAPEUTIC PROCEDURES: CPT

## 2019-11-12 ENCOUNTER — HOSPITAL ENCOUNTER (OUTPATIENT)
Dept: CARDIAC REHAB | Age: 79
Setting detail: THERAPIES SERIES
Discharge: HOME | End: 2019-11-12
Payer: MEDICARE

## 2019-11-12 PROCEDURE — 97150 GROUP THERAPEUTIC PROCEDURES: CPT

## 2019-11-14 ENCOUNTER — HOSPITAL ENCOUNTER (OUTPATIENT)
Dept: CARDIAC REHAB | Age: 79
Setting detail: THERAPIES SERIES
Discharge: HOME | End: 2019-11-14
Payer: MEDICARE

## 2019-11-14 PROCEDURE — 97150 GROUP THERAPEUTIC PROCEDURES: CPT

## 2019-11-19 ENCOUNTER — HOSPITAL ENCOUNTER (OUTPATIENT)
Dept: CARDIAC REHAB | Age: 79
Setting detail: THERAPIES SERIES
Discharge: HOME | End: 2019-11-19
Payer: MEDICARE

## 2019-11-19 PROCEDURE — 97150 GROUP THERAPEUTIC PROCEDURES: CPT

## 2019-11-21 ENCOUNTER — HOSPITAL ENCOUNTER (OUTPATIENT)
Dept: CARDIAC REHAB | Age: 79
Setting detail: THERAPIES SERIES
Discharge: HOME | End: 2019-11-21
Payer: MEDICARE

## 2019-11-21 PROCEDURE — 97150 GROUP THERAPEUTIC PROCEDURES: CPT

## 2019-11-26 ENCOUNTER — HOSPITAL ENCOUNTER (OUTPATIENT)
Dept: CARDIAC REHAB | Age: 79
Setting detail: THERAPIES SERIES
Discharge: HOME | End: 2019-11-26
Payer: MEDICARE

## 2019-11-26 PROCEDURE — 97150 GROUP THERAPEUTIC PROCEDURES: CPT

## 2019-11-28 ENCOUNTER — APPOINTMENT (OUTPATIENT)
Dept: CARDIAC REHAB | Age: 79
End: 2019-11-28
Payer: MEDICARE

## 2019-12-03 ENCOUNTER — HOSPITAL ENCOUNTER (OUTPATIENT)
Dept: CARDIAC REHAB | Age: 79
Setting detail: THERAPIES SERIES
Discharge: HOME | End: 2019-12-03
Payer: MEDICARE

## 2019-12-05 ENCOUNTER — HOSPITAL ENCOUNTER (OUTPATIENT)
Dept: CARDIAC REHAB | Age: 79
Setting detail: THERAPIES SERIES
Discharge: HOME | End: 2019-12-05
Payer: MEDICARE

## 2019-12-05 PROCEDURE — 97150 GROUP THERAPEUTIC PROCEDURES: CPT

## 2019-12-10 ENCOUNTER — HOSPITAL ENCOUNTER (OUTPATIENT)
Dept: CARDIAC REHAB | Age: 79
Setting detail: THERAPIES SERIES
End: 2019-12-10
Payer: MEDICARE

## 2019-12-12 ENCOUNTER — HOSPITAL ENCOUNTER (OUTPATIENT)
Dept: CARDIAC REHAB | Age: 79
Setting detail: THERAPIES SERIES
Discharge: HOME | End: 2019-12-12
Payer: MEDICARE

## 2019-12-12 PROCEDURE — 97150 GROUP THERAPEUTIC PROCEDURES: CPT

## 2019-12-17 ENCOUNTER — HOSPITAL ENCOUNTER (OUTPATIENT)
Dept: CARDIAC REHAB | Age: 79
Setting detail: THERAPIES SERIES
Discharge: HOME | End: 2019-12-17
Payer: MEDICARE

## 2019-12-17 PROCEDURE — 97150 GROUP THERAPEUTIC PROCEDURES: CPT

## 2019-12-19 ENCOUNTER — HOSPITAL ENCOUNTER (OUTPATIENT)
Dept: CARDIAC REHAB | Age: 79
Setting detail: THERAPIES SERIES
End: 2019-12-19
Payer: MEDICARE

## 2019-12-19 NOTE — PLAN OF CARE
[address] Facility-Based Therapy for Chronic Lung Conditions  INDIVIDUAL TREATMENT PLAN (ITP)     Name: Brenda GUERREROB: 1940  Account Number: [de-identified]  AGE: 78 y.o. Diagnosis:  ILD . Patient Goals:  (x) Breathe better  (x) Increase my endurance  (x) Have more energy  (x) Rely less on others  (x) Ease ADLs  (x) Understand & Use meds correctly  (x) Control cough  (x) Make fewer visits to hospital  () Quit smoking  (x) Feel less anxious/ have more confidence    Glossary: MS=Pulmonary Rehab  PF= Physical Fitness  TM=Treadmill  AD= [employer] Bike  UBE=Upperbody Ergometry  RT=Resistance Training   PLB=Pursed Lip Breathing    INDIVIDUAL TREATMENT PLAN      INITIAL ASSESSMENT    Day #1 INDIVIDUAL TREATMENT PLAN        PLAN    Day #2-30 INDIVIDUAL TREATMENT PLAN      RE ASSESSMENT    Day #31-60 INDIVIDUAL TREATMENT PLAN      RE ASSESSMENT    Day #61-90 INDIVIDUAL TREATMENT PLAN        DISCHARGE    Last Day     Date: 8/27/2019     Date: 9/26/19   Date: 10/24/19   Date: 11/21/19   Date: 12/19/19    *Patient requested to be done with program. Patient will be discharged.    EXERCISE   INITIAL ASSESSMENT    Prescribed Oxygen Use  Activity: 3 L/min  () Continuous  (x) Breath Actuated    Oxygen Titration  (x) Assess for desaturation          Current Home Exercise:  Frequency:7x/wk  Intensity:  2/10  Duration:30min  Mode:walking   EXERCISE  PLAN      Current Oxygen Use  Activity: 3-4 L/min  (x) Continuous  () Breath Actuated    Oxygen Titration  (x) Maintaining >87% Spo2  () Not maintaining -  L/min needed on     Current Home Exercise:  Frequency: 7 x/wk  Intensity:  5/10  Duration: 30 min  Mode: walk   EXERCISE  RE ASSESSMENT    Current Oxygen Use  Activity: 4 L/min  (x) Continuous  () Breath Actuated    Oxygen Titration  (x) Maintaining >87% Spo2  () Not maintaining -  L/min needed on     Current Home Exercise:  Frequency: 7 x/wk  Intensity: 5 progressing  () Pt not progressing Physical Limitations Reassessment      (x) Pt progressing  () Pt not progressing Physical Limitations  Discharge      () Issues Addressed  () PT/OT suggested       Exercise Goals   Initial Assessment:    (x) Start to, and commit to exercising regularly      (x) Learn about home activity recommendations            (x) Increase Functional Capacity shown by increasing 6MWD   Exercise Goals   Plan      -Initiate RTR 2x/week  -Encourage home exercise      -Attend Home Activity EDUCATION class          -Slowly, safely, progress in RTR    Exercise Goals   Reassessment      (x) Pt is coming regularly  () Pt is sporadic        () Pt has had class  (x) Pt has not had class          (x) Pt is progressing  () Pt is not progressing Exercise Goals   Reassessment      (x) Pt is coming regularly  () Pt is sporadic        () Pt has had class  (x) Pt has not had class          (x) Pt is progressing  () Pt is not progressing Exercise Goals Discharge      (x) GOAL Met?  () GOAL not met -          () Pt had EDUCATION class  Date:   (x) Pt did not have class      () GOAL Met  See 6MWD above  (x) GOAL not Met: Patient did not complete.       Exercise Intervention Initial Assessment      () Transportation needed        (x) EDUCATION needed          (x) Motivation needed   Exercise Intervention/ Education   Plan      () [facility] set up        (x) EDUCATION:  Home activity class to be given        (x) Positive encouragement, support and motivation to be given   Exercise Intervention/ Education Reassessment      (x) Pt is attending  () Pt is not attending      () Pt has had class  (x) Pt has not had class      (x) Pt is progressing  () Pt not progressing Exercise Intervention/ Education Reassessment      (x) Pt is attending  () Pt is not attending      () Pt has had class  (x) Pt has not had class      (x) Pt is progressing  () Pt not progressing Exercise Intervention/ Edcuation Discharge      (x) Pt (x) Pt progressing  () Pt not progressing Nutrition Goals  Discharge        () Pt had class  Date:  (x) DM: Pt has not had class          () Pt has had class  Date: See above  (x) Pt has not had class    () Pt has class  Date: See above  (x) Pt has not had class      () Final weight goal achieved  (x) Pt did not reach desired weight goal - (readdressed nutrition and exercise strategies for future goal attainment)        Nutrition Intervention/ Education   Initial Assessment    (x) Pt needs Nutrition education class        () Pt states does not need class       Nutrition Intervention/ Education  Plan      (x) Pt will have Nutrition class          (x) Encourage pt to continue to learn and incorporate self knowledge in to diet choices   Nutrition Intervention/ Education  Reassessment      () Pt has had class  (x) Pt has not had class      () Pt had questions  () Pt denies having questions Nutrition Intervention/ Education  Reassessment      () Pt has had class  (x) Pt has not had class      () Pt had questions  () Pt denies having questions Nutrition Intervention/ Education   Discharge      () Pt has had class  Date: See above  (x) Pt has not had class - Reason:    () Pt had questions that were answered   () Pt denies having questions            PSYCHOSOCIAL INITIAL ASSESSMENT    Depression:  () Self Reported  (x) In History  () S/Sx noted  () Denies  (x) On Medication  (x) Follows physician        (x) Give PHQ-9 Depression screening tool                Dyspnea:  (x) Give pt UCSD SOB survey    (x) Pt gets SOB during activity and with ADLs        (x) Pt has support from home for the program      () Pt does not have support for the program   PSYCHOSOCIAL  PLAN      (x) Attend Stress/ Depression/ Anxiety Class                  Pre Rehab PHQ-9   Score: 2  1-4 Normal  5-9 Mild  10-14 Mod  15-19 Mod-Sev  >19 Severe      Pre Rehab UCSD SOB Score: 19    (x) Attend classes and attend rehab to increase strength and endurance    -Attend Psychosocial class        () Speak with the patient/ family about ability to commit to the program with undue stress/ anxiety   PSYCHOSOCIAL  RE ASSESSMENT    () Pt >10 on PHQ-9    Action:                   () Pt recommended to contact physician for assistance              See below for ADLs      (x) Pt has had class  () Pt has not had class      Re assessment of support:  Good, pt has been attending       PSYCHOSOCIAL  RE ASSESSMENT    (x) Pt is coping well  () Pt needs more assistance-                  () Pt recommended to contact physician for assistance              See below for ADLs      () Pt has had class  (x) Pt has not had class      Re assessment of support:  Good, pt has been attending                     PSYCHOSOCIAL DISCHARGE      (x) Pt is coping well  () Pt needs more assistance-                  Post Rehab PHQ-9   Depression Score:  Patient did not complete. Post Rehab UCSD SOB Score: Patient did not complete.     () Pt has had class  Date:   (x) Pt did not have class    () Pt had class  Date: See above  (x) Pt did not have class      () Pt completed program  (x) Pt had to be discharged-patient requested to be done with program       Psychosocial Goals   Initial Assessment    (x) Maintain/ Decrease Depression Levels        (x) Maintain/ Decrease Anxiety Levels        (x) Learn about Emotional Health          () Give the CAT tool for HR QoL            Pre Rehab  CAT score:  10 / 40 Psychosocial Goals  Plan      (x) Attend Stress/ Anxiety/ Dyspnea - Panic control class        (x) Attend Stress/ Anxiety/ Dyspnea - Panic control class      (x) Attend Stress/ Anxiety/ Dyspnea - Panic control class      (x) Initiate RTR, get stronger, more endurance and more confidence      30 day CAT score:  19 /40 Psychosocial Goals  Reassessment      () Pt has had class  () Pt has not had class        (x) Pt has had class  () Pt has not had class      (x) Pt has had class  () Pt has not had class      (x) Pt is progressing  () Pt is not progressing        60 day CAT score:  15/40   Psychosocial Goals  Reassessment      () Pt has had class  (x) Pt has not had class        (x) Pt has had class  () Pt has not had class      (x) Pt has had class  () Pt has not had class      (x) Pt is progressing  () Pt is not progressing        90 day CAT score:  16/40   Psychosocial Goals Discharge                        () Pt had class  Date: See above  (x) Pt did not have class      Post Rehab CAT score below                      Post Rehab  CAT score:   Patient did not complete. OUTCOMES    PHQ-9:  Did pt drop a severity level or maintain in the minimal range? Patient did not complete. UCSD SOB  Did pt decrease their number by 5 or more? Patient did not complete. CAT scores:  Did pt drop by 2 or more points from Pre to Post?  Patient did not complete. Psychosocial Intervention/ Education Initial Assessment    (x) Pt is being treated for depression/ anxiety      (x) Pt would benefit from RTRs Psychosocial Issues Class (Stress, Depression, Anxiety, and Intimacy   Psychosocial  Intervention/ Education Plan      (x) Pt to contact physician if any severe changes occur in mood      (x) Pt will attend RTRs class   Psychosocial Intervention/ Education Re assessment    (x) Pt is coping well  () Pt is not coping well         () Pt has had class  () Pt has not had class Psychosocial Intervention/ Education Re assessment    (x) Pt is coping well  () Pt is not coping well         () Pt has had class  (x) Pt has not had class Psychosocial Intervention/ Education Discharge    (x) Pt did not need any changes   () Pt needed changes to treatment    () Pt had class  Date:   (x) Pt did not have class         Pain   Initial Assessment    () N/A  Location: back Duration: not every day, but several days a week min.  Intensity: 5-6/10 Type: ache      Pain   Plan      () N/A    (x) Pts pain is under control    () Pt Attend O2 Use, safety and travel class        (x) Assess for SpO2 with each exercise   Oxygen Intervention/ Education  Reassessment      () Pt has had class  (x) Pt has not had class      (x) Pt is >87%  () Pt has been found to desaturate - physician notified Oxygen Intervention/ Education  Reassessment      () Pt has had class  (x) Pt has not had class      (x) Pt is >87%  () Pt has been found to desaturate - physician notified Oxygen Intervention/ Education  Discharge      (x) Pt had class  Date: 12/3/19  () Pt did not have class      (x) Pt did well  () Pt needed to be put on oxygen          TOBACCO USE  INITIAL ASSESSMENT    (x) Pt currently not smoking    () Pt currently smoking  () Pt needs Tobacco Cessation counseling    Smoked 1 ppd for 30 years  Pt quit in [address]      (x) N/A      Does pt want to quit:   Does pt have a quit date:      () Tobacco Cessation counseling Education if applicable      () Encouraged to contact physician for tools/ nicotine replacement etc.   TOBACCO USE  RE ASSESSMENT    (x) N/A      Any change in Tobacco use status (x) N  ()Y        () Pt attended counseling education session          () Pt has contacted physician TOBACCO USE  RE ASSESSMENT    (x) N/A      Any change in Tobacco use status (x) N  ()Y        () Pt attended counseling education session          () Pt has contacted physician TOBACCO USE  DISCHARGE            Current Tobacco Use Status: NA          () Pt attended TC counseling education session  Date:         () Pt contacted physician      NRT product/ medication  :     Tobacco Use Goal  Initial Assessment    (x) Complete Cessation or Maintain Cessation of tobacco products   Tobacco Use Goal Intervention and Education Plan    (x) Tobacco free    () Encourage pt to fight the urge, call quit line, use techniques learned from friends/ class    () Attend Tobacco Cessation class if needed   Tobacco Use  Reassessment        (x) Pt remains tobacco had class  (x) Pt has not had class Medication Goals  Discharge          % proper med usage see above        () Pt had class  Date: See above  (x) Pt has not had class            ADL  INITIAL ASSESSMENT    (x) Impaired ADL ability  () Need for Assist Devices  (x) Generalized weakness, low functional capacity  () Stairs in house           ADL  PLAN      -Initiate VA, RT, and chair squats  -Breathing retraining, PLB, and pacing      -Encourage home activity             ADL  RE ASSESSMENT    x Pt is progressing  () Pt is not progressing        (x) Pt has initiated home activity  () Pt has not yet initiated  home activity-    (x) ADLs getting easier  () ADLs not getting easier   ADL  RE ASSESSMENT    (x) Pt is progressing  () Pt is not progressing        (x) Pt has initiated home activity  () Pt has not yet initiated  home activity-    (x) ADLs getting easier  () ADLs not getting easier ADL  DISCHARGE      (x) Pt showed strength and endurance gains  () Pt did not progress      (x) Pt doing recommended home activity  () Pt not doing home activitiy       ADL Goals   Initial Assessment      (x) Decrease SOB with ADLs          (x) Decreased SOB overall      ADL Goals Intervention/ Education Plan      -Initiate RTR, RT and chair squats and increase pts strength and endurance    -Pacing, Breathing retraining       ADL Goals Reassessment        (x) ADLs getting easier  () ADLs not getting easier      (x) Pt states activities are getting easier/ able to go longer/ not get as SOB ADL Goals Reassessment        (x) ADLs getting easier  () ADLs not getting easier      (x) Pt states activities are getting easier/ able to go longer/ not get as SOB ADL Goals   Discharge        Goal achieved  (x) Yes  () No        Goal achieved  (x) Yes  () No         Outcomes:  See Health and Functioning from the CAT tool and Strength and Endurance from 6 MWD above              EXACERBATION PREVENTION & MANAGEMENT  INITIAL ASSESSMENT    Pt reports;  () 0 respiratory infections  (x) 1   () >2  (x) Hospitalized in last 12 months   EXACERBATION PREVENTION & MANAGEMENT  PLAN      Address via Disease Self Management and Exacerbation prevention class:    -Hydration for mucus    -Hand Hygiene          -Evaluation of Sputum    -When to call MD  -S/Sx to report  -Decrease exposure to irritants/ exacerbators    -Cleaning of respiratory equipment   EXACERBATION PREVENTION & MANAGEMENT  RE ASSESSMENT    () Pt has had class  (x) Pt has not had class      () Improved hydration    () Correct hand washing techs and alcohol gel usage    () Sputum assessment    () Ability to recognize exacerbation and when to call MD        () Cleaning of respiratory equipment EXACERBATION PREVENTION & MANAGEMENT  RE ASSESSMENT    (x) Pt has had class  () Pt has not had class      () Improved hydration    () Correct hand washing techs and alcohol gel usage    () Sputum assessment    () Ability to recognize exacerbation and when to call MD        () Cleaning of respiratory equipment EXACERBATION PREVENTION & MANAGEMENT  DISCHARGE      (x) Pt has had class  Date:12/3/19  () Pt did not have class              () Addressed questions and pt feels comfortable with hydration, hand washing, sputum assessment, exacerbation knowledge, and cleaning of equipment   Exacerbation Prevention & Management Goals  Initial Assessment        (x) Learn ways to stay healthy and not get admitted          (x) Increase knowledge of Lungs, Breathing, Exercise, Nutrition, Mood and controlling anxiety, and stopping the Dyspnea Cycle    Exacerbation Prevention & Management Goals Intervention/ Education  Plan      -Attend class and demonstrate disease self management strategies      -Attend all appropriate classes                   Exacerbation Prevention & Management Goals  Reassessment          () Pt has had class  (x) Pt has not had class        () Pt has had all classes  () Pt has had most classes  (x) Pt has had little/ not staying for education Exacerbation Prevention & Management Goals  Reassessment          (x) Pt has had class  () Pt has not had class        () Pt has had all classes  () Pt has had most classes  (x) Pt has had little Exacerbation Prevention & Management Goals  Discharge          (x) Pt had class  Date:  See above  () Pt did not have class        (x) Pts attended all relevant classes and all  Questions were answered                 PHYSICIAN INTERACTION    MD Initial Assessment Review    (x) Initial  Assessment Reviewed  (x) Treatment Plan and Goals support patient needs/ abilities                  Cosigned and dated below:   PHYSICIAN INTERACTION    MD 30 day and Plan Review:      (x) I have been in the rehab room to see the patient during this 30 day reassessment  (x) I agree with the plan  (x) Continue with progression and instruction  () Continue, but with the following changes:      Cosigned and dated below: PHYSICIAN INTERACTION    MD 30 day Reassessment Review:    (x) I have been in the rehab room to see the patient during this 30 day reassessmen  (x) Continue with the current program  () Continue, but with the following changes:  () Discharge patient      Cosigned and dated below: PHYSICIAN INTERACTION    MD 30 day Reassessment Review:    (x) I have been in the rehab room to see the patient during this 30 day reassessmen  (x) Continue with the current program  () Continue, but with the following changes:  () Discharge patient      Cosigned and dated below: Lon[place] End    MD 30 day   Discharge Review:    (x) Discharge patient                            Cosigned and dated below:       Date/Time User Provider Type Action   8/29/2019  3:57 PM Talat Menendez MD Physician Cosign   8/27/2019  3:35 PM Gosia Dominique RCP Respiratory Therapist Sign       Date/Time User Provider Type Action   9/30/2019  4:15 PM Talat Menendez MD Physician Cosign   9/26/2019  2:23 PM Hugh Reid

## 2019-12-24 ENCOUNTER — APPOINTMENT (OUTPATIENT)
Dept: CARDIAC REHAB | Age: 79
End: 2019-12-24
Payer: MEDICARE

## 2020-01-01 ENCOUNTER — OFFICE VISIT (OUTPATIENT)
Dept: PULMONOLOGY | Age: 80
End: 2020-01-01
Payer: MEDICARE

## 2020-01-01 ENCOUNTER — HOSPITAL ENCOUNTER (OUTPATIENT)
Dept: PULMONOLOGY | Age: 80
Discharge: HOME | End: 2020-10-15
Payer: MEDICARE

## 2020-01-01 ENCOUNTER — OFFICE VISIT (OUTPATIENT)
Dept: FAMILY MEDICINE CLINIC | Age: 80
End: 2020-01-01
Payer: MEDICARE

## 2020-01-01 ENCOUNTER — HOSPITAL ENCOUNTER (OUTPATIENT)
Dept: GENERAL RADIOLOGY | Age: 80
Discharge: HOME | End: 2020-10-15
Payer: MEDICARE

## 2020-01-01 ENCOUNTER — HOSPITAL ENCOUNTER (OUTPATIENT)
Age: 80
Discharge: HOME | End: 2020-10-15
Payer: MEDICARE

## 2020-01-01 ENCOUNTER — HOSPITAL ENCOUNTER (OUTPATIENT)
Age: 80
Setting detail: SPECIMEN
Discharge: HOME | End: 2020-10-06
Payer: MEDICARE

## 2020-01-01 VITALS
BODY MASS INDEX: 22.88 KG/M2 | TEMPERATURE: 98.8 F | WEIGHT: 109 LBS | DIASTOLIC BLOOD PRESSURE: 60 MMHG | OXYGEN SATURATION: 99 % | HEART RATE: 102 BPM | HEIGHT: 58 IN | SYSTOLIC BLOOD PRESSURE: 124 MMHG

## 2020-01-01 VITALS
DIASTOLIC BLOOD PRESSURE: 60 MMHG | BODY MASS INDEX: 22.7 KG/M2 | SYSTOLIC BLOOD PRESSURE: 112 MMHG | HEIGHT: 59 IN | OXYGEN SATURATION: 97 % | WEIGHT: 112.6 LBS | HEART RATE: 109 BPM | TEMPERATURE: 98.9 F

## 2020-01-01 VITALS
SYSTOLIC BLOOD PRESSURE: 122 MMHG | WEIGHT: 114.2 LBS | HEART RATE: 80 BPM | HEIGHT: 59 IN | OXYGEN SATURATION: 98 % | DIASTOLIC BLOOD PRESSURE: 62 MMHG | BODY MASS INDEX: 23.02 KG/M2 | TEMPERATURE: 98.2 F

## 2020-01-01 LAB — SARS-COV-2: NOT DETECTED

## 2020-01-01 PROCEDURE — 94618 PULMONARY STRESS TESTING: CPT | Performed by: NURSE PRACTITIONER

## 2020-01-01 PROCEDURE — 99214 OFFICE O/P EST MOD 30 MIN: CPT | Performed by: NURSE PRACTITIONER

## 2020-01-01 PROCEDURE — 71045 X-RAY EXAM CHEST 1 VIEW: CPT

## 2020-01-01 PROCEDURE — 99214 OFFICE O/P EST MOD 30 MIN: CPT | Performed by: FAMILY MEDICINE

## 2020-01-01 PROCEDURE — U0003 INFECTIOUS AGENT DETECTION BY NUCLEIC ACID (DNA OR RNA); SEVERE ACUTE RESPIRATORY SYNDROME CORONAVIRUS 2 (SARS-COV-2) (CORONAVIRUS DISEASE [COVID-19]), AMPLIFIED PROBE TECHNIQUE, MAKING USE OF HIGH THROUGHPUT TECHNOLOGIES AS DESCRIBED BY CMS-2020-01-R: HCPCS

## 2020-01-01 RX ORDER — FAMOTIDINE 20 MG/1
20 TABLET, FILM COATED ORAL 2 TIMES DAILY
Qty: 180 TABLET | Refills: 1 | Status: SHIPPED | OUTPATIENT
Start: 2020-01-01 | End: 2021-01-01 | Stop reason: SDUPTHER

## 2020-01-01 RX ORDER — AMLODIPINE BESYLATE 5 MG/1
5 TABLET ORAL DAILY
Qty: 90 TABLET | Refills: 1 | Status: SHIPPED | OUTPATIENT
Start: 2020-01-01 | End: 2021-01-01 | Stop reason: SDUPTHER

## 2020-01-01 RX ORDER — FLUOXETINE HYDROCHLORIDE 20 MG/1
60 CAPSULE ORAL DAILY
Qty: 270 CAPSULE | Refills: 1 | Status: SHIPPED | OUTPATIENT
Start: 2020-01-01 | End: 2021-01-01 | Stop reason: SDUPTHER

## 2020-01-01 RX ORDER — ALBUTEROL SULFATE 90 UG/1
2 AEROSOL, METERED RESPIRATORY (INHALATION) EVERY 6 HOURS PRN
Qty: 1 INHALER | Refills: 3 | Status: SHIPPED | OUTPATIENT
Start: 2020-01-01

## 2020-01-01 RX ORDER — SIMVASTATIN 20 MG
20 TABLET ORAL NIGHTLY
Qty: 90 TABLET | Refills: 1 | Status: SHIPPED | OUTPATIENT
Start: 2020-01-01 | End: 2021-01-01

## 2020-01-01 RX ORDER — LOSARTAN POTASSIUM 25 MG/1
25 TABLET ORAL DAILY
Qty: 90 TABLET | Refills: 1 | Status: SHIPPED | OUTPATIENT
Start: 2020-01-01 | End: 2021-01-01 | Stop reason: SDUPTHER

## 2020-01-01 SDOH — ECONOMIC STABILITY: FOOD INSECURITY: WITHIN THE PAST 12 MONTHS, THE FOOD YOU BOUGHT JUST DIDN'T LAST AND YOU DIDN'T HAVE MONEY TO GET MORE.: NEVER TRUE

## 2020-01-01 SDOH — ECONOMIC STABILITY: TRANSPORTATION INSECURITY
IN THE PAST 12 MONTHS, HAS LACK OF TRANSPORTATION KEPT YOU FROM MEETINGS, WORK, OR FROM GETTING THINGS NEEDED FOR DAILY LIVING?: NO

## 2020-01-01 SDOH — ECONOMIC STABILITY: FOOD INSECURITY: WITHIN THE PAST 12 MONTHS, YOU WORRIED THAT YOUR FOOD WOULD RUN OUT BEFORE YOU GOT MONEY TO BUY MORE.: NEVER TRUE

## 2020-01-01 SDOH — ECONOMIC STABILITY: TRANSPORTATION INSECURITY
IN THE PAST 12 MONTHS, HAS THE LACK OF TRANSPORTATION KEPT YOU FROM MEDICAL APPOINTMENTS OR FROM GETTING MEDICATIONS?: NO

## 2020-01-01 SDOH — ECONOMIC STABILITY: INCOME INSECURITY: HOW HARD IS IT FOR YOU TO PAY FOR THE VERY BASICS LIKE FOOD, HOUSING, MEDICAL CARE, AND HEATING?: NOT HARD AT ALL

## 2020-01-01 ASSESSMENT — ENCOUNTER SYMPTOMS
NAUSEA: 0
WHEEZING: 0
DIARRHEA: 0
RHINORRHEA: 1
SHORTNESS OF BREATH: 1
COUGH: 0
SHORTNESS OF BREATH: 1
NAUSEA: 0
STRIDOR: 0
NAUSEA: 0
WHEEZING: 0
CHEST TIGHTNESS: 0
STRIDOR: 0
WHEEZING: 0
CHEST TIGHTNESS: 0
SHORTNESS OF BREATH: 1
VOMITING: 0
DIARRHEA: 0
VOMITING: 0
ABDOMINAL PAIN: 0
COUGH: 1

## 2020-01-13 ENCOUNTER — OFFICE VISIT (OUTPATIENT)
Dept: FAMILY MEDICINE CLINIC | Age: 80
End: 2020-01-13
Payer: MEDICARE

## 2020-01-13 VITALS
HEART RATE: 72 BPM | BODY MASS INDEX: 23.91 KG/M2 | WEIGHT: 118.6 LBS | DIASTOLIC BLOOD PRESSURE: 62 MMHG | HEIGHT: 59 IN | SYSTOLIC BLOOD PRESSURE: 124 MMHG | OXYGEN SATURATION: 96 %

## 2020-01-13 PROCEDURE — 99214 OFFICE O/P EST MOD 30 MIN: CPT | Performed by: FAMILY MEDICINE

## 2020-01-13 RX ORDER — AMLODIPINE BESYLATE 10 MG/1
10 TABLET ORAL DAILY
Qty: 90 TABLET | Refills: 1 | Status: SHIPPED | OUTPATIENT
Start: 2020-01-13 | End: 2020-01-01 | Stop reason: SDUPTHER

## 2020-01-13 RX ORDER — RANITIDINE 150 MG/1
150 CAPSULE ORAL DAILY
COMMUNITY
End: 2020-01-13

## 2020-01-13 RX ORDER — FAMOTIDINE 20 MG/1
20 TABLET, FILM COATED ORAL 2 TIMES DAILY
Qty: 180 TABLET | Refills: 1 | Status: SHIPPED | OUTPATIENT
Start: 2020-01-13 | End: 2020-01-01 | Stop reason: SDUPTHER

## 2020-01-13 RX ORDER — LOSARTAN POTASSIUM 25 MG/1
25 TABLET ORAL DAILY
Qty: 90 TABLET | Refills: 1 | Status: SHIPPED | OUTPATIENT
Start: 2020-01-13 | End: 2020-01-01 | Stop reason: SDUPTHER

## 2020-01-13 RX ORDER — SIMVASTATIN 20 MG
20 TABLET ORAL NIGHTLY
Qty: 90 TABLET | Refills: 1 | Status: SHIPPED | OUTPATIENT
Start: 2020-01-13 | End: 2020-01-01 | Stop reason: SDUPTHER

## 2020-01-13 RX ORDER — OMEPRAZOLE 40 MG/1
40 CAPSULE, DELAYED RELEASE ORAL DAILY
COMMUNITY

## 2020-01-13 ASSESSMENT — ENCOUNTER SYMPTOMS
NAUSEA: 0
WHEEZING: 0
SHORTNESS OF BREATH: 1
ABDOMINAL PAIN: 0

## 2020-01-13 NOTE — PROGRESS NOTES
[facility] PROFESSIONAL SERVS  [facility]  [address] [id number]  Dept: [phone]  Dept Fax: [phone]  Loc: [phone]  PROGRESS NOTE      Visit Date: 1/13/2020    Maricruz Marte is a 78 y.o. female who presents today for:  Chief Complaint   Patient presents with    6 Month Follow-Up    Hypertension       Subjective:  Hypertension   Associated symptoms include shortness of breath. Pertinent negatives include no chest pain. Hyperlipidemia   Associated symptoms include shortness of breath. Pertinent negatives include no chest pain. 6 month f/u    HTN:  On losartan and norvasc. She checks her -130s. No hx of CAD. Hypercholesterolemia:  On simvastatin. No myalgias. On aspirin. Depression: On prozac. Mood is good. Sleeping is ok. Interstitial lung disease:  Likely due to hiatal hernia and aspiration per [facility] pulm. On 2-3 L/min oxygen at rest and 3 L/min when active. seen at [facility] for pulm and rheumatology. Breathing is stable. She did 24 weeks of pulmonary rehab.  is present    Review of Systems   Constitutional: Negative for chills and fever. Respiratory: Positive for shortness of breath. Negative for wheezing. Cardiovascular: Negative for chest pain and leg swelling. Gastrointestinal: Negative for abdominal pain and nausea.      Past Medical History:   Diagnosis Date    Depression     Hyperlipidemia     Hypertension       Past Surgical History:   Procedure Laterality Date    BLADDER SUSPENSION      BRONCHOSCOPY N/A 5/15/2019    BRONCHOSCOPY performed by Lorraine Eubanks MD at [facility]    BRONCHOSCOPY  5/15/2019    BRONCHOSCOPY ALVEOLAR LAVAGE performed by Lorraine Eubanks MD at [facility]    BRONCHOSCOPY  5/15/2019    BRONCHOSCOPY/TRANSBRONCHIAL LUNG BIOPSY performed by Lorraine Eubanks MD at [address] WITH IMPLANT Bilateral     HYSTERECTOMY

## 2020-01-14 ENCOUNTER — NURSE ONLY (OUTPATIENT)
Dept: FAMILY MEDICINE CLINIC | Age: 80
End: 2020-01-14
Payer: MEDICARE

## 2020-01-14 LAB
ALBUMIN SERPL-MCNC: 4.2 G/DL (ref 3.5–5.1)
ALP BLD-CCNC: 118 U/L (ref 38–126)
ALT SERPL-CCNC: 10 U/L (ref 11–66)
ANION GAP SERPL CALCULATED.3IONS-SCNC: 12 MEQ/L (ref 8–16)
AST SERPL-CCNC: 21 U/L (ref 5–40)
BILIRUB SERPL-MCNC: 0.4 MG/DL (ref 0.3–1.2)
BUN BLDV-MCNC: 13 MG/DL (ref 7–22)
CALCIUM SERPL-MCNC: 9.8 MG/DL (ref 8.5–10.5)
CHLORIDE BLD-SCNC: 102 MEQ/L (ref 98–111)
CHOLESTEROL, TOTAL: 173 MG/DL (ref 100–199)
CO2: 30 MEQ/L (ref 23–33)
CREAT SERPL-MCNC: 0.8 MG/DL (ref 0.4–1.2)
ERYTHROCYTE [DISTWIDTH] IN BLOOD BY AUTOMATED COUNT: 14 % (ref 11.5–14.5)
ERYTHROCYTE [DISTWIDTH] IN BLOOD BY AUTOMATED COUNT: 47.8 FL (ref 35–45)
GFR SERPL CREATININE-BSD FRML MDRD: 69 ML/MIN/1.73M2
GLUCOSE BLD-MCNC: 101 MG/DL (ref 70–108)
HCT VFR BLD CALC: 38.7 % (ref 37–47)
HDLC SERPL-MCNC: 54 MG/DL
HEMOGLOBIN: 11.8 GM/DL (ref 12–16)
LDL CHOLESTEROL CALCULATED: 93 MG/DL
MCH RBC QN AUTO: 28.2 PG (ref 26–33)
MCHC RBC AUTO-ENTMCNC: 30.5 GM/DL (ref 32.2–35.5)
MCV RBC AUTO: 92.6 FL (ref 81–99)
PLATELET # BLD: 303 THOU/MM3 (ref 130–400)
PMV BLD AUTO: 11 FL (ref 9.4–12.4)
POTASSIUM SERPL-SCNC: 4.1 MEQ/L (ref 3.5–5.2)
RBC # BLD: 4.18 MILL/MM3 (ref 4.2–5.4)
SODIUM BLD-SCNC: 144 MEQ/L (ref 135–145)
TOTAL PROTEIN: 7.7 G/DL (ref 6.1–8)
TRIGL SERPL-MCNC: 132 MG/DL (ref 0–199)
WBC # BLD: 9.1 THOU/MM3 (ref 4.8–10.8)

## 2020-01-14 PROCEDURE — 36415 COLL VENOUS BLD VENIPUNCTURE: CPT | Performed by: FAMILY MEDICINE

## 2020-01-15 ENCOUNTER — TELEPHONE (OUTPATIENT)
Dept: FAMILY MEDICINE CLINIC | Age: 80
End: 2020-01-15

## 2020-01-15 NOTE — TELEPHONE ENCOUNTER
----- Message from Desiree Lopez MD sent at 1/14/2020  8:54 PM EST -----  CMP and lipids are good. CBC shows mild anemia similar to previous. Please advise patient.   Desiree Lopez MD

## 2020-03-04 ENCOUNTER — OFFICE VISIT (OUTPATIENT)
Dept: PULMONOLOGY | Age: 80
End: 2020-03-04
Payer: MEDICARE

## 2020-03-04 VITALS
SYSTOLIC BLOOD PRESSURE: 126 MMHG | HEART RATE: 80 BPM | BODY MASS INDEX: 23.95 KG/M2 | DIASTOLIC BLOOD PRESSURE: 63 MMHG | TEMPERATURE: 97.2 F | HEIGHT: 59 IN | WEIGHT: 118.8 LBS | OXYGEN SATURATION: 96 %

## 2020-03-04 PROBLEM — Z99.81 CHRONIC RESPIRATORY FAILURE WITH HYPOXIA, ON HOME O2 THERAPY (HCC): Status: ACTIVE | Noted: 2019-05-13

## 2020-03-04 PROBLEM — J96.11 CHRONIC RESPIRATORY FAILURE WITH HYPOXIA, ON HOME O2 THERAPY (HCC): Status: ACTIVE | Noted: 2019-05-13

## 2020-03-04 PROCEDURE — 94618 PULMONARY STRESS TESTING: CPT | Performed by: NURSE PRACTITIONER

## 2020-03-04 PROCEDURE — 99214 OFFICE O/P EST MOD 30 MIN: CPT | Performed by: NURSE PRACTITIONER

## 2020-03-04 ASSESSMENT — ENCOUNTER SYMPTOMS
WHEEZING: 0
DIARRHEA: 0
VOMITING: 0
NAUSEA: 0
SHORTNESS OF BREATH: 1
CHEST TIGHTNESS: 0
COUGH: 0
STRIDOR: 0

## 2020-03-04 NOTE — PROGRESS NOTES
[facility]    Patient: Bebo Albrecht, 78 y.o.   : 1940  3/4/2020    Pt of Dr. hSital Trujillo   Patient presents with    Follow-up     COPD 6 month pulm follow up with no testing         HPI  Luz Son is here for follow up for interstitial lung disease. PMH as below significant for GERD, hiatal hernia, Hx of pseudomonas pneumonia. Patient was evaluated by Dr. Morris Wright of [facility] ILD clinic. She is a former smoker quit 30 years ago. She previously underwent CTD testing and had a positive CURT with homogenous pattern with elevated ESR in the past ?SLE and is following with Dr. Herrera Morales of Rheumatology at [facility]. Patient underwent bronchoscopy May 2019 by Dr. Mark Goldstein with TBNB pathology showing fibrotic lung parenchyma with reactive pneumocytes. 2019 office note reviewed from [facility] believe interstitial lung disease likely secondary to chronic aspiration Hx of GERD with hiatal hernia currently on omeprazole per Dr. Jennifer Aldana. Former smoker Quit  1 PPD for 30 years  On supplemental O2 2 LPM at rest/sleep and 3 LPM with exertion    Patient reports that overall respiratory status has remained stable, symptoms include SOB, and coughing mostly non-productive some clear frothy sputum. Exacerbated with position changes, and weather changes particularly humidity increasing. Denies Wheeze, No CP    Progress History:   Since last visit any new medical issues? No  New ER or hospital visits? No  Any new or changes in medicines? No  Using inhalers? No  Are they helpful? No  Pneumonia vaccine?  Reports up to date  Past Medical hx   PMH:  Past Medical History:   Diagnosis Date    Depression     GERD (gastroesophageal reflux disease)     Hiatal hernia     History of Pseudomonas pneumonia     Hyperlipidemia     Hypertension     Interstitial pulmonary disease, unspecified ([facility] [place] [address]     Post inflammatory     SURGICAL HISTORY:  Past Surgical History:   Procedure Laterality Date    BLADDER SUSPENSION      BRONCHOSCOPY N/A 5/15/2019    BRONCHOSCOPY performed by Adeline Huynh MD at  [facility]    BRONCHOSCOPY  5/15/2019    BRONCHOSCOPY ALVEOLAR LAVAGE performed by Adeline Huynh MD at  [email or web address]    BRONCHOSCOPY  5/15/2019    BRONCHOSCOPY/TRANSBRONCHIAL LUNG BIOPSY performed by Adeline Huynh MD at  [facility]    CATARACT REMOVAL WITH IMPLANT Bilateral     HYSTERECTOMY       SOCIAL HISTORY:   Social History     Tobacco Use    Smoking status: Former Smoker     Packs/day: 1.00     Years: 30.00     Pack years: 30.00     Last attempt to quit: 1994     Years since quittin.1    Smokeless tobacco: Never Used   Substance Use Topics    Alcohol use: No    Drug use: No     ALLERGIES:No Known Allergies  FAMILY HISTORY:  Family History   Problem Relation Age of Onset    High Blood Pressure Mother     Heart Disease Mother     Cancer Father     Heart Disease Father      CURRENT MEDICATIONS:  Current Outpatient Medications   Medication Sig Dispense Refill    simvastatin (ZOCOR) 20 MG tablet Take 1 tablet by mouth nightly 90 tablet 1    losartan (COZAAR) 25 MG tablet Take 1 tablet by mouth daily 90 tablet 1    amLODIPine (NORVASC) 10 MG tablet Take 1 tablet by mouth daily 90 tablet 1    omeprazole (PRILOSEC) 40 MG delayed release capsule Take 40 mg by mouth daily      famotidine (PEPCID) 20 MG tablet Take 1 tablet by mouth 2 times daily 180 tablet 1    FLUoxetine (PROZAC) 40 MG capsule Take 1 capsule by mouth daily 90 capsule 1    Handicap Placard MISC by Does not apply route Dx: Interstitial lung disease  Duration:  5 years  Expiration:  2024 1 each 0    Misc. Devices (BATH/SHOWER SEAT) MISC Disp:  1 shower chair/seat  Dx:   Interstitial lung disease 1 each 0    aspirin 81 MG tablet Take 1 tablet by mouth daily (with breakfast) 90 tablet 1     No current facility-administered recommends annual screening for lung cancer with low-dose computed tomography (LDCT) in adults aged 54 to [de-identified] years who have a 30 pack-year smoking history and currently smoke or have quit within the past 15 years. Screening should be discontinued once a person has not smoked for 15 years or develops a health problem that substantially limits life expectancy or the ability or willingness to have curative lung surgery. Six Minute Walk Test  [employer] 1940    Six minute walk test done in my office today by my medical assistant. Dorian oxygen saturation at rest on room air was 93%. Her oxygen saturation dropped to 80% on room air with exertion. The six minute walk test was repeated with oxygen supplementation. Oxygen supplimentation was started with 2 LPM via nasal cannula and titrated to 4 LPM via nasal cannula. At the end of the test Safia Petit oxygen saturation remained at 93% on 4 LPM with exertion. She is mobile in the home and requires oxygen as outlined above. Patient ambulated a total of 432  feet and tolerated the walk well with mild SOB and no events. Resting heart rate was 74 and 102 upon completion of the walk. Nasal Oxygen order:  4 lpm to be used with:  Walking Yes Sleep No Continuous No    DME Medical Necessity Documentation    Nathaniel Esposito was seen in the office on 3/4/2020 for the diagnosis ILD. I am prescribing oxygen because the diagnosis and testing requires the patient to have oxygen in the home.  her condition will improve or be benefited by oxygen use. The patient is able to perform good mobility in a home setting and therefore does require the use of a portable oxygen system. Assessment      Diagnosis Orders   1. ILD (interstitial lung disease) ([facility] [address]  6 Minute Walk Test    DME Order for Home Oxygen as OP   2. Chronic respiratory failure with hypoxia, on home O2 therapy (HCC)  6 Minute Walk Test    DME Order for Home Oxygen as OP   3.  Former smoker

## 2020-07-13 PROBLEM — E43 SEVERE MALNUTRITION (HCC): Status: RESOLVED | Noted: 2019-05-14 | Resolved: 2020-01-01

## 2020-07-13 NOTE — PROGRESS NOTES
[facility]  [facility]  [address] [id number]  Dept: [phone]  Dept Fax: [phone]  Loc: [phone]  PROGRESS NOTE      Visit Date: 7/13/2020    Francia Ivory is a 78 y.o. female who presents today for:  Chief Complaint   Patient presents with    6 Month Follow-Up     cholesterol/hial hernia    Hypertension    Depression     would like to discuss increasing prozac or something different    Allergies     off an on runny nose OTC not helping       Subjective:  Hypertension   Associated symptoms include shortness of breath. Pertinent negatives include no chest pain. Hyperlipidemia   Associated symptoms include shortness of breath. Pertinent negatives include no chest pain. 6 month f/u    HTN:  On losartan and norvasc but only takes it when SBP is over 130. she has not needed her BP meds for 4 days. She checks her BP and it is usually -130s. No hx of CAD. Hypercholesterolemia:  On simvastatin. No myalgias. On aspirin. Depression: On prozac 40 mg. Mood is variable and down quite a bit. Sleeping is ok. Has decreased energy. Interstitial lung disease:  Likely due to hiatal hernia and aspiration per [facility] pulm. On 4 L/min oxygen when moving and no supplemental oxygen at rest . seen at [facility] for pulm and rheumatology. Breathing is stable. New problem: Allergies have been worse. She is on otc allergy med:  Benadryl.  is present    Review of Systems   Constitutional: Negative for chills and fever. HENT: Positive for congestion and rhinorrhea. Respiratory: Positive for shortness of breath. Negative for wheezing. Cardiovascular: Negative for chest pain and leg swelling. Gastrointestinal: Negative for abdominal pain and nausea.      Past Medical History:   Diagnosis Date    Depression     GERD (gastroesophageal reflux disease)     Hiatal hernia     History of Pseudomonas pneumonia     Hyperlipidemia     Hypertension     Interstitial pulmonary disease, unspecified ([facility] [address]     Post inflammatory      Past Surgical History:   Procedure Laterality Date    BLADDER SUSPENSION      BRONCHOSCOPY N/A 5/15/2019    BRONCHOSCOPY performed [facility] Saw [facility]ly [facility]    BRONCHOSCOPY  5/15/2019    BRONCHOSCOPY ALVEOLAR LAVAGE performed by Saw [facility]ly MD at [facility]    BRONCHOSCOPY  5/15/2019    BRONCHOSCOPY/TRANSBRONCHIAL LUNG BIOPSY [facility]    CATARACT REMOVAL WITH IMPLANT Bilateral     HYSTERECTOMY       Family History   Problem Relation Age of Onset    High Blood Pressure Mother     Heart Disease Mother     Cancer Father     Heart Disease Father      Social History     Tobacco Use    Smoking status: Former Smoker     Packs/day: 1.00     Years: 30.00     Pack years: 30.00     Last attempt to quit: 1994     Years since quittin.5    Smokeless tobacco: Never Used   Substance Use Topics    Alcohol use: No      Current Outpatient Medications   Medication Sig Dispense Refill    simvastatin (ZOCOR) 20 MG tablet Take 1 tablet by mouth nightly 90 tablet 1    losartan (COZAAR) 25 MG tablet Take 1 tablet by mouth daily 90 tablet 1    amLODIPine (NORVASC) 10 MG tablet Take 1 tablet by mouth daily 90 tablet 1    omeprazole (PRILOSEC) 40 MG delayed release capsule Take 40 mg by mouth daily      famotidine (PEPCID) 20 MG tablet Take 1 tablet by mouth 2 times daily 180 tablet 1    FLUoxetine (PROZAC) 40 MG capsule Take 1 capsule by mouth daily 90 capsule 1    Handicap Placard MISC by Does not apply route Dx: Interstitial lung disease  Duration:  5 years  Expiration:  2024 1 each 0    Misc. Devices (BATH/SHOWER SEAT) MISC Disp:  1 shower chair/seat  Dx:   Interstitial lung disease 1 each 0    aspirin 81 MG tablet Take 1 tablet by mouth daily (with breakfast) 90 tablet 1     No current facility-administered medications for this visit. No Known Allergies  Health Maintenance   Topic Date Due    DTaP/Tdap/Td vaccine (1 - Tdap) 09/02/1959    Shingles Vaccine (1 of 2) 09/02/1990    Annual Wellness Visit (AWV)  07/15/2020    Flu vaccine (1) 09/01/2020    Lipid screen  01/14/2021    Potassium monitoring  01/14/2021    Creatinine monitoring  01/14/2021    Pneumococcal 65+ years Vaccine  Completed    DEXA (modify frequency per FRAX score)  Addressed    Hepatitis A vaccine  Aged Out    Hepatitis B vaccine  Aged Out    Hib vaccine  Aged Out    Meningococcal (ACWY) vaccine  Aged Out       Objective:     /62 (Site: Left Upper Arm, Position: Sitting, Cuff Size: Medium Adult)   Pulse 80   Temp 98.2 °F (36.8 °C)   Ht 4' 11\" (1.499 m)   Wt 114 lb 3.2 oz (51.8 kg)   SpO2 98%   BMI 23.07 kg/m²   Physical Exam   Constitutional: She is oriented to person, place, and time. She appears well-developed and well-nourished. Cardiovascular: Normal rate and regular rhythm. Occasional extrasystoles are present. No murmur heard. Pulmonary/Chest: Effort normal. No respiratory distress. She has no decreased breath sounds. She has no wheezes. She has no rhonchi. She has rales in the right middle field, the right lower field, the left middle field and the left lower field. Musculoskeletal:         General: No edema. Neurological: She is alert and oriented to person, place, and time. Psychiatric: Her speech is normal and behavior is normal. She exhibits a depressed mood. Vitals reviewed.     Lab Results   Component Value Date    WBC 9.1 01/14/2020    HGB 11.8 (L) 01/14/2020    HCT 38.7 01/14/2020     01/14/2020    CHOL 173 01/14/2020    TRIG 132 01/14/2020    HDL 54 01/14/2020    ALT 10 (L) 01/14/2020    AST 21 01/14/2020     01/14/2020    K 4.1 01/14/2020     01/14/2020    CREATININE 0.8 01/14/2020    BUN 13 01/14/2020    CO2 30 01/14/2020    TSH 0.908 04/30/2019    INR

## 2020-09-14 NOTE — PROGRESS NOTES
[facility]    Patient: Mary Ann Solomon, [de-identified] y.o.   : 1940    Pt of Dr. Sonya Wang   Patient presents with    Follow-up     ILD 6 month follow up with no testing         HPI  Jh White is here for follow up for ILD. PMH significant for GERD, hiatal hernia, Hx of pseudomonas pneumonia. Patient was referred to Dr. Gilda Em of [facility] ILD clinic previously. She is a former smoker quit 30 years ago. She previously underwent CTD testing and had a positive CURT with homogenous pattern with elevated ESR in the past ?SLE and was previously following with Dr. 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 Patient underwent bronchoscopy May 2019 by Dr. Porter Rosales with TBNB pathology showing fibrotic lung parenchyma with reactive pneumocytes. Evaluated at [facility] believe interstitial lung disease likely secondary to chronic aspiration Hx of GERD with hiatal hernia currently on omeprazole recently seen by Dr. Jeremy Braden telemedicine visit 2020. Former smoker Quit  1 PPD for 30 years  On supplemental O2 4 LPM with exertion    Progress History:     Feels breathing has been slightly worse feels more SOB with exertion. Previously walking 20 mins per day only able to do 10 mins now. Has been self titrating supplemental O2 at home. Denies productive cough,     No CP   Was walking for 20 minutes on treadmill currently only doing ten. Daily    Denies nasal congestion some drainage        Since last visit any new medical issues? No  New ER or hospital visits? No  Any new or changes in medicines? Yes PCP increased prozac feels has helped  Using inhalers? No  Are they helpful? No  Flu vaccine? Pneumonia vaccine?   Past Medical hx   PMH:  Past Medical History:   Diagnosis Date    Depression     GERD (gastroesophageal reflux disease)     Hiatal hernia     History of Pseudomonas pneumonia     Hyperlipidemia     Hypertension     Interstitial pulmonary disease, unspecified ([address]     Post inflammatory     SURGICAL HISTORY:  Past Surgical History:   Procedure Laterality Date    BLADDER SUSPENSION      BRONCHOSCOPY N/A 5/15/2019    BR[facility]    BRONCHOSCOPY  5/15/2019    BRONCHOSCOPY ALVEOLAR LAVAGE performed by Ni Kaur MD at [facility]    BRONCHOSCOPY  5/15/2019    BRONCHOSCOPY/TRANSBRONCHIAL LUNG BIOPSY performed by [facility] at [facility]    CATARACT REMOVAL WITH IMPLANT Bilateral     HYSTERECTOMY       SOCIAL HISTORY:  Social History     Tobacco Use    Smoking status: Former Smoker     Packs/day: 1.00     Years: 30.00     Pack years: 30.00     Last attempt to quit: 1994     Years since quittin.7    Smokeless tobacco: Never Used   Substance Use Topics    Alcohol use: No    Drug use: No     ALLERGIES:No Known Allergies  FAMILY HISTORY:  Family History   Problem Relation Age of Onset    High Blood Pressure Mother     Heart Disease Mother     Cancer Father     Heart Disease Father      CURRENT MEDICATIONS:  Current Outpatient Medications   Medication Sig Dispense Refill    simvastatin (ZOCOR) 20 MG tablet Take 1 tablet by mouth nightly 90 tablet 1    losartan (COZAAR) 25 MG tablet Take 1 tablet by mouth daily 90 tablet 1    famotidine (PEPCID) 20 MG tablet Take 1 tablet by mouth 2 times daily 180 tablet 1    amLODIPine (NORVASC) 5 MG tablet Take 1 tablet by mouth daily 90 tablet 1    FLUoxetine (PROZAC) 20 MG capsule Take 3 capsules by mouth daily 270 capsule 1    omeprazole (PRILOSEC) 40 MG delayed release capsule Take 40 mg by mouth daily      Handicap Placard MISC by Does not apply route Dx: Interstitial lung disease  Duration:  5 years  Expiration:  2024 1 each 0    Misc.  Devices (BATH/SHOWER SEAT) MISC Disp: 1 shower chair/seat  Dx: Interstitial lung disease 1 each 0    aspirin 81 MG tablet Take 1 tablet by mouth daily (with breakfast) 90 tablet 1     No current facility-administered medications for this visit. Leeann PEREIRA   Review of Systems   Constitutional: Negative for chills, fever and unexpected weight change. Respiratory: Positive for shortness of breath. Negative for cough, chest tightness, wheezing and stridor. With exertion    Cardiovascular: Negative for chest pain and leg swelling. Gastrointestinal: Negative for diarrhea, nausea and vomiting. Genitourinary: Negative for dysuria. Physical exam   /60 (Site: Left Upper Arm, Position: Sitting, Cuff Size: Medium Adult)   Pulse 109   Temp 98.9 °F (37.2 °C) (Tympanic)   Ht 4' 11\" (1.499 m)   Wt 112 lb 9.6 oz (51.1 kg)   SpO2 97% Comment: on 3 LPM  BMI 22.74 kg/m²    Wt Readings from Last 3 Encounters:   09/14/20 112 lb 9.6 oz (51.1 kg)   07/13/20 114 lb 3.2 oz (51.8 kg)   03/04/20 118 lb 12.8 oz (53.9 kg)       Physical Exam  Vitals signs and nursing note reviewed. Constitutional:       General: She is not in acute distress. Appearance: She is well-developed. HENT:      Head: Normocephalic and atraumatic. Neck:      Musculoskeletal: Neck supple. Trachea: No tracheal deviation. Cardiovascular:      Rate and Rhythm: Normal rate and regular rhythm. Heart sounds: Normal heart sounds. No murmur. Pulmonary:      Effort: Pulmonary effort is normal. No respiratory distress. Breath sounds: No stridor. Rales present. No wheezing. Comments: bilateral  Chest:      Chest wall: No tenderness. Abdominal:      General: Bowel sounds are normal. There is no distension. Palpations: Abdomen is soft. Skin:     General: Skin is warm and dry. Capillary Refill: Capillary refill takes less than 2 seconds. Neurological:      Mental Status: She is alert and oriented to person, place, and time. Psychiatric:         Behavior: Behavior normal.         Thought Content: Thought content normal.          Results   Lung Nodule Screening     [] Qualifies    [x] Does not qualify   [] Declined    [] Completed  Former smoker Quit 1994   The USPSTF recommends annual screening for lung cancer with low-dose computed tomography (LDCT) in adults aged 54 to [de-identified] years who have a 30 pack-year smoking history and currently smoke or have quit within the past 15 years. Screening should be discontinued once a person has not smoked for 15 years or develops a health problem that substantially limits life expectancy or the ability or willingness to have curative lung surgery. Six Minute Walk Test  [employer] 1940    Six minute walk test done in my office today by my medical assistant. Gumes oxygen saturation at rest on room air was 86% started on 2 LPM. Her oxygen saturation dropped to 85% on 2 LPM with exertion. The six minute walk test was repeated with oxygen supplementation. Oxygen supplimentation was started with 2 LPM via nasal cannula and titrated to 6 LPM via nasal cannula. At the end of the test Safia Peitt oxygen saturation remained at 94% on 6 LPM with exertion. She is mobile in the home and requires oxygen as outlined above. Patient ambulated a total of 98 meters and tolerated the walk well with mild SOB and no events. Resting heart rate was 83 and 108 upon completion of the walk. Nasal Oxygen order: 2 LPM to use at rest/sleep and 6 LPM with exertion      DME Medical Necessity Documentation    Tali David was seen in the office on 9/14/2020 for the diagnosis Interstitial lung disease. I am prescribing oxygen because the diagnosis and testing requires the patient to have oxygen in the home.  her condition will improve or be benefited by oxygen use.  The patient is able to perform good mobility in a home setting and therefore does require the use of a portable oxygen system. Assessment      Diagnosis Orders   1. ILD (interstitial lung disease) ([facility])  6 Minute Walk Test    Full PFT Study With Bronchodilator    XR CHEST 1 VIEW    COVID-19 Ambulatory    DME Order for Home Oxygen as OP   2.  Chronic respiratory failure with hypoxia, on home O2 therapy (HCC)  6 Minute Walk Test    Full PFT Study With Bronchodilator    XR CHEST 1 VIEW    COVID-19 Ambulatory    DME Order for Home Oxygen as OP         Plan   -6 MWT-O2 2LPM 6LPM with exertion  -DME order placed for supplemental O2  -Check CXR due to increased hypoxia, will call with results   -Update PFT with diffusion was previously completing in [facility] due to pandemic wishes to complete locally  -Discussed GI prophylaxis recommend continuing per Dr. Mami Batres recommendations  -Advised to maintain pneumonia vaccine with PCP and to take flu vaccine this coming season.  -Advised patient to call office with any changes, questions, or concerns regarding respiratory status    Will see Francia Delgadillo in: 1 month with Full PFT    Shaina Cuellar CNP  9/14/2020

## 2020-10-15 NOTE — PROGRESS NOTES
Patient attempted pulmonary function test. Patient unable to complete any part of the test. No data to report.

## 2020-10-22 NOTE — PROGRESS NOTES
[facility]    Patient: Marilynn Aguilar, [de-identified] y.o.   : 1940  10/22/2020    Pt of Dr. Corby Das   Patient presents with    Follow-up     Follow up ILD    Other     Chest Xray Pft completed on 10/15/20         HPI  Emmie Jaime is here for follow up for ILD. PMH significant for GERD, hiatal hernia, Hx of pseudomonas pneumonia. Patient was referred to Dr. Gab Cortez of [facility] ILD clinic previously. She is a former smoker quit 30 years ago. She previously underwent CTD testing and had a positive CURT with homogenous pattern with elevated ESR  and was following with Rheumatology at [facility] Dr. Tonya Perez, at last appointment was undergoing lab work for la antibody's r/o sjogren's which was negative. Currently being treated for osteoarthritis with colchicine and plaquenil. Patient was sent for Pulmonary function testing to monitor diffusion defect as medications she is on for erosive osteoarthritis can have S/E of Pulmonary toxicity. Patient was unable to complete maneuvers for testing per RT report. Patient not taking any rheumatoid medications. Progress History:   Patient presents today with her daughter reports overall she has been doing same to slightly worse she recently lost 2 close family members 1 to cancer and the other to Covid-19 which was sudden. She feels that she has not had much appetite or \"energy\". She still cares for her self does admit to some mild depression. States not walking as much as previously and her SpO2 has been running low 90's to upper 80's drops when she is exerting even with using 6 LPM. Denies GERD symptoms or difficulty swallowing. Patient reports she was not interested in using Rheumatoid medication and is currently not using any. Does 10 min walk on treadmill at times. 6 MWT 2020 2 LPM at rest and 6 LPM with exertion    Since last visit any new medical issues?  No  New ER or hospital Bowel sounds are normal. There is no distension. Palpations: Abdomen is soft. Skin:     General: Skin is warm and dry. Capillary Refill: Capillary refill takes less than 2 seconds. Neurological:      Mental Status: She is alert and oriented to person, place, and time. Psychiatric:         Behavior: Behavior normal.         Thought Content: Thought content normal.          Results   Lung Nodule Screening     [] Qualifies    [x] Does not qualify   [] Declined    [] Completed  30 pack years quit 1994   The USPSTF recommends annual screening for lung cancer with low-dose computed tomography (LDCT) in adults aged 54 to [de-identified] years who have a 30 pack-year smoking history and currently smoke or have quit within the past 15 years. Screening should be discontinued once a person has not smoked for 15 years or develops a health problem that substantially limits life expectancy or the ability or willingness to have curative lung surgery. Patient unable to complete testing. Unable to complete DLCO maneuver                    XR CHEST 1 VIEW   10/15/2020   FINDINGS: There is diffuse interstitial prominence which does not appear substantially changed compared to prior exam. No definite new focal opacity is identified. The cardiomediastinal silhouette is stable. Visualized osseous structures appear grossly intact. Impression:   Stable diffuse chronic interstitial disease. Assessment      Diagnosis Orders   1. ILD (interstitial lung disease) ([facility])  6 Minute Walk Test   2. Chronic respiratory failure with hypoxia, on home O2 therapy ([facility])           ILD due to uncontrolled GERD 2/2 hiatal hernia-CXR stable unable to complete spirometry testing    Plan   -Reviewed ILD most likely 2/2 complications from previous pneumonia and uncontrolled GERD process and therapy prognosis dicussed verbalizes understanding  -Diet and lifestyle modifications discussed. I counseled on changing eating habits.    · Its best to eat several small meals instead of two or three large meals. · Last meal should be 2 to 3 hours before lying down. · Stop drinking alcohol, stop smoking  · Avoid late-night snacks . · Chocolate, mint, and alcohol can make GERD worse. They relax the valve between the esophagus and the stomach  · Spicy foods, foods that have a lot of acid (like tomatoes and oranges), and coffee can make GERD symptoms worse in some people. · Do not smoke or chew tobacco.  · sleep with the head of bed elevated 30 degrees, or 6in. (15 cm) to 8 in. (20 cm) by putting the frame on blocks or placing a foam wedge under the head of your mattress. · Adding extra pillows does not work. · Losing weight will reduce pressure on your stomach.  Losing just 5 to 10 pounds can help    -6 MWT today to eval O2 needs did not feel able to complete, continue on current settings  -Encouraged her to remain physically active to avoid deconditioning  -Advised her to talk with PCP regarding grief counseling if she wishes  -Advised to maintain pneumonia vaccine with PCP and to take flu vaccine this coming season.  -Advised patient to call office with any changes, questions, or concerns regarding respiratory status    Will see Michael Rosen in: 8 months no test    Madhu January CNP  10/22/2020

## 2020-10-22 NOTE — PATIENT INSTRUCTIONS
Diet and lifestyle modifications to limit GERD   · Its best to eat several small meals instead of two or three large meals. · Last meal should be 2 to 3 hours before lying down. · Stop drinking alcohol, stop smoking  · Avoid late-night snacks . · Chocolate, mint, and alcohol can make GERD worse. They relax the valve between the esophagus and the stomach  · Spicy foods, foods that have a lot of acid (like tomatoes and oranges), and coffee can make GERD symptoms worse in some people. · Do not smoke or chew tobacco.  · sleep with the head of bed elevated 30 degrees, or 6in. (15 cm) to 8 in. (20 cm) by putting the frame on blocks or placing a foam wedge under the head of your mattress. · Adding extra pillows does not work. · Losing weight will reduce pressure on your stomach.  Losing just 5 to 10 pounds can help

## 2021-01-01 ENCOUNTER — OFFICE VISIT (OUTPATIENT)
Dept: FAMILY MEDICINE CLINIC | Age: 81
End: 2021-01-01
Payer: MEDICARE

## 2021-01-01 ENCOUNTER — HOSPITAL ENCOUNTER (OUTPATIENT)
Age: 81
Setting detail: SPECIMEN
Discharge: HOME | End: 2021-04-07
Payer: MEDICARE

## 2021-01-01 VITALS
SYSTOLIC BLOOD PRESSURE: 122 MMHG | HEIGHT: 58 IN | TEMPERATURE: 97.3 F | HEART RATE: 120 BPM | WEIGHT: 98.5 LBS | DIASTOLIC BLOOD PRESSURE: 62 MMHG | OXYGEN SATURATION: 96 % | BODY MASS INDEX: 20.68 KG/M2

## 2021-01-01 DIAGNOSIS — Z51.5 ENCOUNTER FOR ADMISSION TO HOSPICE CARE: ICD-10-CM

## 2021-01-01 DIAGNOSIS — I10 ESSENTIAL HYPERTENSION: ICD-10-CM

## 2021-01-01 DIAGNOSIS — J84.9 ILD (INTERSTITIAL LUNG DISEASE) (HCC): Primary | ICD-10-CM

## 2021-01-01 DIAGNOSIS — K44.9 HIATAL HERNIA: ICD-10-CM

## 2021-01-01 DIAGNOSIS — F33.1 MODERATE EPISODE OF RECURRENT MAJOR DEPRESSIVE DISORDER (HCC): ICD-10-CM

## 2021-01-01 DIAGNOSIS — K29.50 OTHER CHRONIC GASTRITIS WITHOUT HEMORRHAGE: ICD-10-CM

## 2021-01-01 DIAGNOSIS — Z71.89 DNR (DO NOT RESUSCITATE) DISCUSSION: ICD-10-CM

## 2021-01-01 DIAGNOSIS — Z51.5 END OF LIFE CARE: ICD-10-CM

## 2021-01-01 DIAGNOSIS — E44.0 MODERATE PROTEIN-CALORIE MALNUTRITION (HCC): ICD-10-CM

## 2021-01-01 DIAGNOSIS — E78.00 PURE HYPERCHOLESTEROLEMIA: ICD-10-CM

## 2021-01-01 DIAGNOSIS — J96.11 CHRONIC RESPIRATORY FAILURE WITH HYPOXIA, ON HOME O2 THERAPY (HCC): ICD-10-CM

## 2021-01-01 DIAGNOSIS — Z99.81 CHRONIC RESPIRATORY FAILURE WITH HYPOXIA, ON HOME O2 THERAPY (HCC): ICD-10-CM

## 2021-01-01 LAB
BACTERIA: ABNORMAL
BILIRUBIN URINE: ABNORMAL
BLOOD, URINE: ABNORMAL
CASTS: ABNORMAL /LPF
CASTS: ABNORMAL /LPF
CHARACTER, URINE: ABNORMAL
COLOR: ABNORMAL
CRYSTALS: ABNORMAL
EPITHELIAL CELLS, UA: ABNORMAL /HPF
GLUCOSE, URINE: NEGATIVE MG/DL
ICTOTEST: NEGATIVE
KETONES, URINE: NEGATIVE
LEUKOCYTE EST, POC: ABNORMAL
MISCELLANEOUS LAB TEST RESULT: ABNORMAL
NITRITE, URINE: POSITIVE
PH UA: >= 9 (ref 5–9)
PROTEIN UA: >= 300 MG/DL
RBC URINE: > 100 /HPF
RENAL EPITHELIAL, UA: ABNORMAL
SPECIFIC GRAVITY UA: <= 1.005 (ref 1–1.03)
UROBILINOGEN, URINE: 0.2 EU/DL (ref 0–1)
WBC UA: ABNORMAL /HPF
YEAST: ABNORMAL

## 2021-01-01 PROCEDURE — 81001 URINALYSIS AUTO W/SCOPE: CPT

## 2021-01-01 PROCEDURE — 3288F FALL RISK ASSESSMENT DOCD: CPT | Performed by: FAMILY MEDICINE

## 2021-01-01 PROCEDURE — 99215 OFFICE O/P EST HI 40 MIN: CPT | Performed by: FAMILY MEDICINE

## 2021-01-01 RX ORDER — FLUOXETINE HYDROCHLORIDE 20 MG/1
60 CAPSULE ORAL DAILY
Qty: 270 CAPSULE | Refills: 1 | Status: SHIPPED | OUTPATIENT
Start: 2021-01-01

## 2021-01-01 RX ORDER — AMLODIPINE BESYLATE 2.5 MG/1
2.5 TABLET ORAL DAILY
Qty: 90 TABLET | Refills: 0 | Status: SHIPPED | OUTPATIENT
Start: 2021-01-01 | End: 2021-01-01

## 2021-01-01 RX ORDER — LOSARTAN POTASSIUM 25 MG/1
25 TABLET ORAL DAILY
Qty: 90 TABLET | Refills: 1 | Status: SHIPPED | OUTPATIENT
Start: 2021-01-01

## 2021-01-01 RX ORDER — FAMOTIDINE 20 MG/1
20 TABLET, FILM COATED ORAL 2 TIMES DAILY
Qty: 180 TABLET | Refills: 1 | Status: SHIPPED | OUTPATIENT
Start: 2021-01-01

## 2021-01-01 RX ORDER — SIMVASTATIN 20 MG
20 TABLET ORAL NIGHTLY
Qty: 90 TABLET | Refills: 1 | Status: CANCELLED | OUTPATIENT
Start: 2021-01-01

## 2021-01-01 ASSESSMENT — ENCOUNTER SYMPTOMS
ABDOMINAL PAIN: 0
RHINORRHEA: 1
NAUSEA: 0
WHEEZING: 0
SHORTNESS OF BREATH: 1

## 2021-01-15 PROBLEM — E44.0 MODERATE PROTEIN-CALORIE MALNUTRITION (HCC): Status: ACTIVE | Noted: 2021-01-01

## 2021-01-15 PROBLEM — Z51.5 ENCOUNTER FOR ADMISSION TO HOSPICE CARE: Status: ACTIVE | Noted: 2021-01-01

## 2021-01-15 NOTE — PROGRESS NOTES
[facility]  [facility]  [address] [id number]  Dept: [phone]  Dept Fax: [phone]  Loc: [phone]  PROGRESS NOTE      Visit Date: 1/15/2021    Mónica Headley is a [de-identified] y.o. female who presents today for:  Chief Complaint   Patient presents with    6 Month Follow-Up    Hypertension    Mood Swings       Subjective:  Hypertension  Associated symptoms include shortness of breath. Pertinent negatives include no chest pain. Hyperlipidemia  Associated symptoms include shortness of breath. Pertinent negatives include no chest pain. 6 month f/u    HTN:  On losartan and norvasc. Home BPs are about SBP 120s. No hx of CAD. Hypercholesterolemia:  On simvastatin. No myalgias. On aspirin. Depression: On prozac 40 mg. Mood is not good due to not feeling well. Feels hopeless. Interstitial lung disease:  Likely due to hiatal hernia and aspiration per [facility] pulm. On 6 L/min oxygen when moving and 2.5-3 L/min oxygen at rest which is worse than 3 months ago when she was on room air at rest.  Breathing is worsening. Follows with [facility] as well. New problem:   11 lb wt loss since oct. Not eating much. No appetite. Pulse in 90-100s for past month. Having constipation issues. BM every couple days. Straining to have a BM    daughter is present    Discussed end of life issues. Patient has discussed with  and family. She has discussed hospice. Declines ventilator, CPR, AED, and end of life meds. Review of Systems   Constitutional: Negative for chills and fever. HENT: Positive for congestion and rhinorrhea. Respiratory: Positive for shortness of breath. Negative for wheezing. Cardiovascular: Negative for chest pain and leg swelling. Gastrointestinal: Negative for abdominal pain and nausea.      Past Medical History:   Diagnosis Date    Depression     GERD (gastroesophageal reflux disease)  Hiatal hernia     History of Pseudomonas pneumonia     Hyperlipidemia     Hypertension     Interstitial pulmonary disease, unspecified ([address]     Post inflammatory      Past Surgical History:   Procedure Laterality Date    BLADDER SUSPENSION      BRONCHOSCOPY N/A 5/15/2019    [facility]    BRONCHOSCOPY  5/15/2019    BRONCHOSCOPY ALVEOLAR LAVAGE performed by [facility] MD [facility]    BRONCHOSCOPY  5/15/2019    BRONCHOSCOPY/TRANSBRONCHIAL LUNG BIOPSY performed by [facility]anan MD at [facility]    CATARACT REMOVAL WITH IMPLANT Bilateral     HYSTERECTOMY       Family History   Problem Relation Age of Onset    High Blood Pressure Mother     Heart Disease Mother     Cancer Father     Heart Disease Father      Social History     Tobacco Use    Smoking status: Former Smoker     Packs/day: 1.00     Years: 30.00     Pack years: 30.00     Quit date: 1994     Years since quittin.0    Smokeless tobacco: Never Used   Substance Use Topics    Alcohol use: No      Current Outpatient Medications   Medication Sig Dispense Refill    albuterol sulfate HFA (PROVENTIL HFA) 108 (90 Base) MCG/ACT inhaler Inhale 2 puffs into the lungs every 6 hours as needed for Wheezing 1 Inhaler 3    simvastatin (ZOCOR) 20 MG tablet Take 1 tablet by mouth nightly 90 tablet 1    losartan (COZAAR) 25 MG tablet Take 1 tablet by mouth daily 90 tablet 1    famotidine (PEPCID) 20 MG tablet Take 1 tablet by mouth 2 times daily 180 tablet 1    amLODIPine (NORVASC) 5 MG tablet Take 1 tablet by mouth daily 90 tablet 1    FLUoxetine (PROZAC) 20 MG capsule Take 3 capsules by mouth daily 270 capsule 1    omeprazole (PRILOSEC) 40 MG delayed release capsule Take 40 mg by mouth daily      Handicap Placard MISC by Does not apply route Dx:   Interstitial lung disease  Duration:  5 years  Expiration:  2024 1 each 0  Misc. Devices (BATH/SHOWER SEAT) MISC Disp:  1 shower chair/seat  Dx: Interstitial lung disease 1 each 0    aspirin 81 MG tablet Take 1 tablet by mouth daily (with breakfast) 90 tablet 1     No current facility-administered medications for this visit. No Known Allergies  Health Maintenance   Topic Date Due    DTaP/Tdap/Td vaccine (1 - Tdap) 09[birth year]02/1959    Shingles Vaccine (1 of 2) 09/02/1990    Annual Wellness Visit (AWV)  05/29/2019    Potassium monitoring  01/14/2021    Creatinine monitoring  01/14/2021    Lipid screen  01/14/2021    Flu vaccine  Completed    Pneumococcal 65+ years Vaccine  Completed    DEXA (modify frequency per FRAX score)  Addressed    Hepatitis A vaccine  Aged Out    Hepatitis B vaccine  Aged Out    Hib vaccine  Aged Out    Meningococcal (ACWY) vaccine  Aged Out       Objective:     /62 (Site: Left Upper Arm, Position: Sitting)   Pulse 120   Temp 97.3 °F (36.3 °C)   Ht 4' 10\" (1.473 m)   Wt 98 lb 8 oz (44.7 kg)   SpO2 96%   BMI 20.59 kg/m²   Physical Exam   Constitutional: She is oriented to person, place, and time. She appears well-developed. She appears cachectic. No distress. Cardiovascular: Regular rhythm. Tachycardia present. No murmur heard. Pulmonary/Chest: Effort normal. No respiratory distress. She has decreased breath sounds (throughout). She has no wheezes. She has no rhonchi. Musculoskeletal:         General: No edema. Neurological: She is alert and oriented to person, place, and time. Psychiatric: Her speech is normal and behavior is normal. She exhibits a depressed mood. Vitals reviewed.     Lab Results   Component Value Date    WBC 9.1 01/14/2020    HGB 11.8 (L) 01/14/2020    HCT 38.7 01/14/2020     01/14/2020    CHOL 173 01/14/2020    TRIG 132 01/14/2020    HDL 54 01/14/2020    ALT 10 (L) 01/14/2020    AST 21 01/14/2020     01/14/2020    K 4.1 01/14/2020     01/14/2020    CREATININE 0.8 01/14/2020 BUN 13 01/14/2020    CO2 30 01/14/2020    TSH 0.908 04/30/2019    INR 1.08 05/13/2019    LABA1C 5.4 07/06/2018     Impression  1. ILD (interstitial lung disease) (HCC)  Chronic problem causing chronic respiratory failure with severe progression. Now on 2.5-3 L/min supplemental oxygen from room air 3 months ago. Has worsening nutrition status with about 11 pound weight loss in 3 months. Life expectancy is anticipated to be less than 6 months.   -will deescalate some of her meds.   -discussed trying a round of oral steroids and she will think about it. - External Referral To Hospice:  [facility]    2. Chronic respiratory failure with hypoxia, on home O2 therapy (HCC)  Worsening. As above. - External Referral To Hospice    3. Pure hypercholesterolemia  Chronic. Stop zocor as she is going on hospice. 4. Essential hypertension  Well-controlled. Chronic condition. Refill medication(s). Decrease norvasc to 2.5 mg given fairly low BP and her recent weight loss. - losartan (COZAAR) 25 MG tablet; Take 1 tablet by mouth daily  Dispense: 90 tablet; Refill: 1  - amLODIPine (NORVASC) 2.5 MG tablet; Take 1 tablet by mouth daily  Dispense: 90 tablet; Refill: 0    5. Other chronic gastritis without hemorrhage  Chronic. Stable. Continue pepcid and prilsoec to decrease likelihood of GI bleed given her hiatal hernia. - famotidine (PEPCID) 20 MG tablet; Take 1 tablet by mouth 2 times daily  Dispense: 180 tablet; Refill: 1    6. Hiatal hernia  As above. - famotidine (PEPCID) 20 MG tablet; Take 1 tablet by mouth 2 times daily  Dispense: 180 tablet; Refill: 1    7. Moderate episode of recurrent major depressive disorder (HCC)  Chronic. Ok. Refill med. - FLUoxetine (PROZAC) 20 MG capsule; Take 3 capsules by mouth daily  Dispense: 270 capsule; Refill: 1    8.  Moderate protein-calorie malnutrition ([address] Chronic. New problem. Progressing fairly rapidly with significant weight loss. She declines appetite stimulants. Likely due to lung issues and gastritis/hernia. - External Referral To Hospice    9. End of life care  -discussed her wishes. Will start de-escalation of meds. 10. Encounter for admission to hospice care  Discussed in detail. Order for [facility] hospice at home. - External Referral To Hospice    11. DNR (do not resuscitate) discussion  Form completed. DNR comfort care. Discussed in detail. They voiced understanding. All questions answered. They agreed with treatment plan. Educational materials given - see patient instructions. Discussed use, benefit, and side effects of prescribed medications. Reviewed health maintenance. Return in about 6 weeks (around 2/26/2021) for hospice care.         Electronically signed by Emiliano Stanley MD on 1/15/2021 at 2:51 PM

## 2021-07-04 DIAGNOSIS — I10 ESSENTIAL HYPERTENSION: ICD-10-CM

## 2021-07-04 RX ORDER — LOSARTAN POTASSIUM 25 MG/1
TABLET ORAL
Qty: 90 TABLET | Refills: 0 | OUTPATIENT
Start: 2021-07-04

## 2021-07-08 DIAGNOSIS — I10 ESSENTIAL HYPERTENSION: ICD-10-CM

## 2021-07-08 RX ORDER — LOSARTAN POTASSIUM 25 MG/1
TABLET ORAL
Qty: 90 TABLET | Refills: 0 | OUTPATIENT
Start: 2021-07-08

## 2024-11-07 NOTE — PROGRESS NOTES
Kari Hinds is a 51 y.o. female who presents to the office today with chief complaint of pain to the bottom of the right heel.  Chief Complaint   Patient presents with    Foot Pain     Right heel, had injection many years ago, pain is now starting to come back    Symptoms began about 6 month(s) ago. Patient denies injury to the right foot. Patient states that the pain is greatest when resuming activity after rest. Pain is rated 8 out of 10 at it's worst and is described as intermittent. Treatments prior to today's visit include: None. Patient had this condition ten years ago and was successfully treated with a steroid injection.      No Known Allergies    Past Medical History:   Diagnosis Date    Headache        Prior to Admission medications    Medication Sig Start Date End Date Taking? Authorizing Provider   rimegepant sulfate (NURTEC) 75 MG TBDP Take 75 mg by mouth daily as needed (migraine) 9/4/24   Virgil Sanchez MD   cholecalciferol (REPLESTA) 1.25 MG (15247 UT) wafer Take 1 Wafer by mouth every 30 days    Bhupinder Knapp MD   calcium carbonate (OSCAL) 500 MG TABS tablet Take 2 tablets by mouth daily    Bhupinder Knapp MD   Multiple Vitamins-Minerals (AIRBORNE) CHEW Take 2 tablets by mouth daily    Bhupinder Knapp MD   Misc Natural Products (OSTEO BI-FLEX ADV TRIPLE ST) TABS Take 2 tablets by mouth daily    Bhupinder Knapp MD       Past Surgical History:   Procedure Laterality Date    COLONOSCOPY N/A 11/6/2023    COLONOSCOPY DIAGNOSTIC performed by [facility]varado [facility] at [facility]       Family History   Problem Relation Age of Onset    Asthma Son     Kidney Disease Maternal Grandfather     Diabetes Maternal Grandmother     Diabetes Paternal Grandmother        Social History     Tobacco Use    Smoking status: Never    Smokeless tobacco: Never   Substance Use Topics    Alcohol use: Yes     Alcohol/week: 2.0 standard drinks of alcohol     Types: 2 Drinks containing 0.5 oz of  exposure to foundry dust/welding: NO  History of exposure to quarry/silica/sandblasting: NO  History of exposure to asbestos/working with breaks/ships: NO  History of exposure to farm dust: Yes  History of recent travel to long distances: NO  History of exposure to birds, pigeons, or chickens in the past:NO  Pet animals at home:Yes  Dogs: 0  Cats: 1     History of pulmonary embolism in the past: No           History of DVT in the past:No          Review of Systems:   General/Constitutional: she gained 4lbs of weight from the last visit with normal appetite. No fever or chills. HENT: Negative. Eyes: Negative. Upper respiratory tract: No nasal stuffiness or post nasal drip. Lower respiratory tract/ lungs: Occasional cough with no sputum production. No hemoptysis. Cardiovascular: No palpitations or chest pain. Gastrointestinal: No nausea or vomiting. Neurological: No focal neurologiacal weakness. Extremities: No edema. Musculoskeletal: No complaints. Genitourinary: No complaints. Hematological: Negative. Psychiatric/Behavioral: Negative. Skin: No itching.       Current Medications:      Past Medical History:   Diagnosis Date    Depression     Hyperlipidemia     Hypertension        Past Surgical History:   Procedure Laterality Date    BLADDER SUSPENSION      BRONCHOSCOPY N/A 5/15/2019    BRONCHOSCOPY performed by Karyn Aguirre MD at [facility]    BRONCHOSCOPY  5/15/2019    BRONCHOSCOPY ALVEOLAR LAVAGE performed by Karyn [facility] MD [facility]    BRONCHOSCOPY  5/15/2019    BRONCHOSCOPY/TRANSBRONCHIAL LUNG BIOPSY performed by Karyn [facility]irre MD at [facility]    CATARACT REMOVAL WITH IMPLANT Bilateral     HYSTERECTOMY         No Known Allergies    Current Outpatient Medications   Medication Sig Dispense Refill    omeprazole (PRILOSEC) 40 MG delayed release capsule Take 40 mg by mouth daily      simvastatin (ZOCOR) 20 MG tablet Take 1 tablet by mouth

## (undated) DEVICE — SET LNR RED GRN W/ BASE CLEANASCOPE

## (undated) DEVICE — SOLUTION IV IRRIG POUR BRL 0.9% SODIUM CHL 2F7124

## (undated) DEVICE — SOLUTION IV 1000ML 0.45% SOD CHL PH 5 INJ USP VIAFLX PLAS

## (undated) DEVICE — TUBING IV STOPCOCK 48 CM 3 W

## (undated) DEVICE — TUBING, SUCTION, 1/4" X 20', STRAIGHT: Brand: MEDLINE INDUSTRIES, INC.

## (undated) DEVICE — SINGLE USE SUCTION VALVE MAJ-209: Brand: SINGLE USE SUCTION VALVE (STERILE)

## (undated) DEVICE — SINGLE USE BIOPSY VALVE MAJ-210: Brand: SINGLE USE BIOPSY VALVE (STERILE)

## (undated) DEVICE — Device

## (undated) DEVICE — CONTAINER,SPECIMEN,PNEU TUBE,4OZ,OR STRL: Brand: MEDLINE

## (undated) DEVICE — YANKAUER,BULB TIP,W/O VENT,RIGID,STERILE: Brand: MEDLINE

## (undated) DEVICE — FORCEPS BX L100CM DIA1.8MM WRK CHN 2MM PULM S STL RAD JAW 4

## (undated) DEVICE — TRAP,MUCUS SPECIMEN, 80CC: Brand: MEDLINE

## (undated) DEVICE — TUBING, SUCTION, 1/4" X 6', STRAIGHT: Brand: MEDLINE

## (undated) DEVICE — ENDO KIT: Brand: MEDLINE INDUSTRIES, INC.

## (undated) DEVICE — Z INACTIVE USE 2735373 APPLICATOR FBR LAIN COT WOOD TIP ECONOMICAL

## (undated) DEVICE — SET ADMIN 25ML L117IN PMP MOD CK VLV RLER CLMP 2 SMRTSITE

## (undated) DEVICE — LINER SUCT CANSTR 1500CC SEMI RIG W/ POR HYDROPHOBIC SHUT